# Patient Record
Sex: FEMALE | Race: WHITE | URBAN - METROPOLITAN AREA
[De-identification: names, ages, dates, MRNs, and addresses within clinical notes are randomized per-mention and may not be internally consistent; named-entity substitution may affect disease eponyms.]

---

## 2017-01-01 ENCOUNTER — OFFICE VISIT (OUTPATIENT)
Dept: TRANSPLANT | Facility: CLINIC | Age: 67
End: 2017-01-01
Attending: INTERNAL MEDICINE
Payer: MEDICARE

## 2017-01-01 ENCOUNTER — TRANSFERRED RECORDS (OUTPATIENT)
Dept: HEALTH INFORMATION MANAGEMENT | Facility: CLINIC | Age: 67
End: 2017-01-01

## 2017-01-01 ENCOUNTER — HOSPITAL ENCOUNTER (OUTPATIENT)
Facility: CLINIC | Age: 67
Discharge: HOME | End: 2017-03-16
Attending: RADIOLOGY | Admitting: RADIOLOGY
Payer: MEDICARE

## 2017-01-01 ENCOUNTER — MYC MEDICAL ADVICE (OUTPATIENT)
Dept: INTERNAL MEDICINE | Facility: CLINIC | Age: 67
End: 2017-01-01

## 2017-01-01 ENCOUNTER — CARE COORDINATION (OUTPATIENT)
Dept: TRANSPLANT | Facility: CLINIC | Age: 67
End: 2017-01-01

## 2017-01-01 ENCOUNTER — TELEPHONE (OUTPATIENT)
Dept: INTERVENTIONAL RADIOLOGY/VASCULAR | Facility: CLINIC | Age: 67
End: 2017-01-01

## 2017-01-01 ENCOUNTER — ONCOLOGY VISIT (OUTPATIENT)
Dept: ONCOLOGY | Facility: CLINIC | Age: 67
End: 2017-01-01
Attending: INTERNAL MEDICINE
Payer: COMMERCIAL

## 2017-01-01 ENCOUNTER — HOSPITAL ENCOUNTER (OUTPATIENT)
Dept: CT IMAGING | Facility: CLINIC | Age: 67
End: 2017-03-16
Attending: CLINICAL NURSE SPECIALIST | Admitting: RADIOLOGY
Payer: MEDICARE

## 2017-01-01 ENCOUNTER — MYC MEDICAL ADVICE (OUTPATIENT)
Dept: OBGYN | Facility: CLINIC | Age: 67
End: 2017-01-01

## 2017-01-01 ENCOUNTER — OFFICE VISIT (OUTPATIENT)
Dept: INTERNAL MEDICINE | Facility: CLINIC | Age: 67
End: 2017-01-01
Payer: COMMERCIAL

## 2017-01-01 ENCOUNTER — OFFICE VISIT (OUTPATIENT)
Dept: OBGYN | Facility: CLINIC | Age: 67
End: 2017-01-01
Payer: COMMERCIAL

## 2017-01-01 ENCOUNTER — APPOINTMENT (OUTPATIENT)
Dept: MEDSURG UNIT | Facility: CLINIC | Age: 67
End: 2017-01-01
Attending: RADIOLOGY
Payer: MEDICARE

## 2017-01-01 ENCOUNTER — RADIANT APPOINTMENT (OUTPATIENT)
Dept: ULTRASOUND IMAGING | Facility: CLINIC | Age: 67
End: 2017-01-01
Attending: INTERNAL MEDICINE
Payer: COMMERCIAL

## 2017-01-01 ENCOUNTER — OFFICE VISIT (OUTPATIENT)
Dept: INTERVENTIONAL RADIOLOGY/VASCULAR | Facility: CLINIC | Age: 67
End: 2017-01-01

## 2017-01-01 VITALS
TEMPERATURE: 98.9 F | SYSTOLIC BLOOD PRESSURE: 125 MMHG | HEART RATE: 80 BPM | BODY MASS INDEX: 24.71 KG/M2 | DIASTOLIC BLOOD PRESSURE: 75 MMHG | OXYGEN SATURATION: 94 % | RESPIRATION RATE: 16 BRPM | WEIGHT: 169.75 LBS

## 2017-01-01 VITALS
DIASTOLIC BLOOD PRESSURE: 74 MMHG | SYSTOLIC BLOOD PRESSURE: 127 MMHG | TEMPERATURE: 98 F | RESPIRATION RATE: 18 BRPM | HEART RATE: 81 BPM | BODY MASS INDEX: 25.09 KG/M2 | WEIGHT: 172.4 LBS

## 2017-01-01 VITALS
WEIGHT: 172 LBS | BODY MASS INDEX: 25.04 KG/M2 | DIASTOLIC BLOOD PRESSURE: 60 MMHG | SYSTOLIC BLOOD PRESSURE: 102 MMHG | OXYGEN SATURATION: 98 % | HEART RATE: 75 BPM

## 2017-01-01 VITALS
HEART RATE: 76 BPM | TEMPERATURE: 96.4 F | BODY MASS INDEX: 24.99 KG/M2 | WEIGHT: 171.6 LBS | SYSTOLIC BLOOD PRESSURE: 121 MMHG | DIASTOLIC BLOOD PRESSURE: 75 MMHG

## 2017-01-01 VITALS
DIASTOLIC BLOOD PRESSURE: 52 MMHG | BODY MASS INDEX: 23.69 KG/M2 | SYSTOLIC BLOOD PRESSURE: 101 MMHG | RESPIRATION RATE: 18 BRPM | HEIGHT: 70 IN | TEMPERATURE: 98 F | OXYGEN SATURATION: 98 % | WEIGHT: 165.5 LBS

## 2017-01-01 VITALS
OXYGEN SATURATION: 97 % | SYSTOLIC BLOOD PRESSURE: 126 MMHG | HEIGHT: 70 IN | HEART RATE: 88 BPM | WEIGHT: 172.7 LBS | BODY MASS INDEX: 24.73 KG/M2 | DIASTOLIC BLOOD PRESSURE: 72 MMHG | TEMPERATURE: 98.1 F

## 2017-01-01 VITALS
WEIGHT: 172 LBS | BODY MASS INDEX: 25.04 KG/M2 | SYSTOLIC BLOOD PRESSURE: 115 MMHG | DIASTOLIC BLOOD PRESSURE: 69 MMHG | HEART RATE: 79 BPM | OXYGEN SATURATION: 97 %

## 2017-01-01 DIAGNOSIS — C83.398 DIFFUSE LARGE B-CELL LYMPHOMA OF EXTRANODAL SITE: Primary | ICD-10-CM

## 2017-01-01 DIAGNOSIS — R59.9 ENLARGED LYMPH NODES: Primary | ICD-10-CM

## 2017-01-01 DIAGNOSIS — R59.9 ENLARGED LYMPH NODES: ICD-10-CM

## 2017-01-01 DIAGNOSIS — N85.9 FLUID IN ENDOMETRIAL CAVITY: ICD-10-CM

## 2017-01-01 DIAGNOSIS — Z94.81 STATUS POST BONE MARROW TRANSPLANT (H): ICD-10-CM

## 2017-01-01 DIAGNOSIS — C85.90 NON-HODGKIN'S LYMPHOMA, UNSPECIFIED BODY REGION, UNSPECIFIED NON-HODGKIN LYMPHOMA TYPE (H): ICD-10-CM

## 2017-01-01 DIAGNOSIS — N85.9 FLUID IN ENDOMETRIAL CAVITY: Primary | ICD-10-CM

## 2017-01-01 DIAGNOSIS — R05.9 COUGH: ICD-10-CM

## 2017-01-01 DIAGNOSIS — C83.38 DIFFUSE LARGE B-CELL LYMPHOMA OF LYMPH NODES OF MULTIPLE REGIONS (H): Primary | ICD-10-CM

## 2017-01-01 DIAGNOSIS — K21.9 GASTROESOPHAGEAL REFLUX DISEASE WITHOUT ESOPHAGITIS: ICD-10-CM

## 2017-01-01 DIAGNOSIS — F33.1 MODERATE EPISODE OF RECURRENT MAJOR DEPRESSIVE DISORDER (H): ICD-10-CM

## 2017-01-01 DIAGNOSIS — Z85.3 PERSONAL HISTORY OF MALIGNANT NEOPLASM OF BREAST: ICD-10-CM

## 2017-01-01 DIAGNOSIS — C34.91 ADENOCARCINOMA, LUNG, RIGHT (H): ICD-10-CM

## 2017-01-01 DIAGNOSIS — C34.90 MALIGNANT NEOPLASM OF LUNG, UNSPECIFIED LATERALITY, UNSPECIFIED PART OF LUNG (H): Primary | ICD-10-CM

## 2017-01-01 DIAGNOSIS — C85.90 NON-HODGKIN'S LYMPHOMA, UNSPECIFIED BODY REGION, UNSPECIFIED NON-HODGKIN LYMPHOMA TYPE (H): Primary | ICD-10-CM

## 2017-01-01 LAB
BASOPHILS # BLD AUTO: 0 10E9/L (ref 0–0.2)
BASOPHILS NFR BLD AUTO: 0.6 %
COPATH REPORT: NORMAL
DIFFERENTIAL METHOD BLD: ABNORMAL
EOSINOPHIL # BLD AUTO: 0.1 10E9/L (ref 0–0.7)
EOSINOPHIL NFR BLD AUTO: 2.3 %
ERYTHROCYTE [DISTWIDTH] IN BLOOD BY AUTOMATED COUNT: 14.2 % (ref 10–15)
HCT VFR BLD AUTO: 41.9 % (ref 35–47)
HGB BLD-MCNC: 13.5 G/DL (ref 11.7–15.7)
IMM GRANULOCYTES # BLD: 0 10E9/L (ref 0–0.4)
IMM GRANULOCYTES NFR BLD: 0 %
INR BLD: 1 (ref 0.86–1.14)
LYMPHOCYTES # BLD AUTO: 0.9 10E9/L (ref 0.8–5.3)
LYMPHOCYTES NFR BLD AUTO: 24.6 %
MCH RBC QN AUTO: 31.5 PG (ref 26.5–33)
MCHC RBC AUTO-ENTMCNC: 32.2 G/DL (ref 31.5–36.5)
MCV RBC AUTO: 98 FL (ref 78–100)
MONOCYTES # BLD AUTO: 0.3 10E9/L (ref 0–1.3)
MONOCYTES NFR BLD AUTO: 8.1 %
NEUTROPHILS # BLD AUTO: 2.2 10E9/L (ref 1.6–8.3)
NEUTROPHILS NFR BLD AUTO: 64.4 %
NRBC # BLD AUTO: 0 10*3/UL
NRBC BLD AUTO-RTO: 0 /100
PLATELET # BLD AUTO: 195 10E9/L (ref 150–450)
RBC # BLD AUTO: 4.28 10E12/L (ref 3.8–5.2)
WBC # BLD AUTO: 3.5 10E9/L (ref 4–11)

## 2017-01-01 PROCEDURE — 25000128 H RX IP 250 OP 636: Performed by: PHYSICIAN ASSISTANT

## 2017-01-01 PROCEDURE — C1769 GUIDE WIRE: HCPCS

## 2017-01-01 PROCEDURE — 99204 OFFICE O/P NEW MOD 45 MIN: CPT | Mod: 25 | Performed by: OBSTETRICS & GYNECOLOGY

## 2017-01-01 PROCEDURE — 99153 MOD SED SAME PHYS/QHP EA: CPT

## 2017-01-01 PROCEDURE — 90648 HIB PRP-T VACCINE 4 DOSE IM: CPT | Mod: ZF | Performed by: INTERNAL MEDICINE

## 2017-01-01 PROCEDURE — 85025 COMPLETE CBC W/AUTO DIFF WBC: CPT | Performed by: RADIOLOGY

## 2017-01-01 PROCEDURE — 25000128 H RX IP 250 OP 636: Performed by: RADIOLOGY

## 2017-01-01 PROCEDURE — 76830 TRANSVAGINAL US NON-OB: CPT | Performed by: OBSTETRICS & GYNECOLOGY

## 2017-01-01 PROCEDURE — 88365 INSITU HYBRIDIZATION (FISH): CPT | Performed by: INTERNAL MEDICINE

## 2017-01-01 PROCEDURE — 76856 US EXAM PELVIC COMPLETE: CPT | Performed by: OBSTETRICS & GYNECOLOGY

## 2017-01-01 PROCEDURE — 99152 MOD SED SAME PHYS/QHP 5/>YRS: CPT

## 2017-01-01 PROCEDURE — 88342 IMHCHEM/IMCYTCHM 1ST ANTB: CPT | Performed by: INTERNAL MEDICINE

## 2017-01-01 PROCEDURE — 99213 OFFICE O/P EST LOW 20 MIN: CPT | Mod: ZF

## 2017-01-01 PROCEDURE — 25000128 H RX IP 250 OP 636: Performed by: NURSE PRACTITIONER

## 2017-01-01 PROCEDURE — 88333 PATH CONSLTJ SURG CYTO XM 1: CPT | Performed by: INTERNAL MEDICINE

## 2017-01-01 PROCEDURE — 88185 FLOWCYTOMETRY/TC ADD-ON: CPT | Performed by: RADIOLOGY

## 2017-01-01 PROCEDURE — 90746 HEPB VACCINE 3 DOSE ADULT IM: CPT | Mod: ZF | Performed by: INTERNAL MEDICINE

## 2017-01-01 PROCEDURE — 25000125 ZZHC RX 250: Mod: ZF | Performed by: INTERNAL MEDICINE

## 2017-01-01 PROCEDURE — 49180 BIOPSY ABDOMINAL MASS: CPT

## 2017-01-01 PROCEDURE — 90670 PCV13 VACCINE IM: CPT | Mod: ZF | Performed by: INTERNAL MEDICINE

## 2017-01-01 PROCEDURE — 99215 OFFICE O/P EST HI 40 MIN: CPT | Mod: ZP | Performed by: INTERNAL MEDICINE

## 2017-01-01 PROCEDURE — 58100 BIOPSY OF UTERUS LINING: CPT | Performed by: OBSTETRICS & GYNECOLOGY

## 2017-01-01 PROCEDURE — 88341 IMHCHEM/IMCYTCHM EA ADD ANTB: CPT | Performed by: INTERNAL MEDICINE

## 2017-01-01 PROCEDURE — 85610 PROTHROMBIN TIME: CPT | Mod: QW

## 2017-01-01 PROCEDURE — 99214 OFFICE O/P EST MOD 30 MIN: CPT | Performed by: INTERNAL MEDICINE

## 2017-01-01 PROCEDURE — G0009 ADMIN PNEUMOCOCCAL VACCINE: HCPCS

## 2017-01-01 PROCEDURE — 40000166 ZZH STATISTIC PP CARE STAGE 1

## 2017-01-01 PROCEDURE — 27210909 ZZH NEEDLE CR5

## 2017-01-01 PROCEDURE — 25000125 ZZHC RX 250: Performed by: RADIOLOGY

## 2017-01-01 PROCEDURE — 90715 TDAP VACCINE 7 YRS/> IM: CPT | Mod: ZF | Performed by: INTERNAL MEDICINE

## 2017-01-01 PROCEDURE — 90713 POLIOVIRUS IPV SC/IM: CPT | Mod: ZF | Performed by: INTERNAL MEDICINE

## 2017-01-01 PROCEDURE — G0010 ADMIN HEPATITIS B VACCINE: HCPCS

## 2017-01-01 PROCEDURE — 27210995 ZZH RX 272: Performed by: RADIOLOGY

## 2017-01-01 PROCEDURE — 90472 IMMUNIZATION ADMIN EACH ADD: CPT

## 2017-01-01 PROCEDURE — 25000128 H RX IP 250 OP 636: Mod: ZF | Performed by: INTERNAL MEDICINE

## 2017-01-01 PROCEDURE — 38505 NEEDLE BIOPSY LYMPH NODES: CPT

## 2017-01-01 PROCEDURE — 40000114 ZZH STATISTIC NO CHARGE CLINIC VISIT

## 2017-01-01 PROCEDURE — 88184 FLOWCYTOMETRY/ TC 1 MARKER: CPT | Performed by: RADIOLOGY

## 2017-01-01 PROCEDURE — 99212 OFFICE O/P EST SF 10 MIN: CPT | Mod: 25

## 2017-01-01 PROCEDURE — 88305 TISSUE EXAM BY PATHOLOGIST: CPT | Performed by: INTERNAL MEDICINE

## 2017-01-01 PROCEDURE — 88305 TISSUE EXAM BY PATHOLOGIST: CPT | Performed by: OBSTETRICS & GYNECOLOGY

## 2017-01-01 RX ORDER — FENTANYL CITRATE 50 UG/ML
25-50 INJECTION, SOLUTION INTRAMUSCULAR; INTRAVENOUS EVERY 5 MIN PRN
Status: DISCONTINUED | OUTPATIENT
Start: 2017-01-01 | End: 2017-01-01 | Stop reason: HOSPADM

## 2017-01-01 RX ORDER — ALBUTEROL SULFATE 90 UG/1
2 AEROSOL, METERED RESPIRATORY (INHALATION) EVERY 4 HOURS PRN
Qty: 1 INHALER | Refills: 11 | Status: SHIPPED | OUTPATIENT
Start: 2017-01-01

## 2017-01-01 RX ORDER — NALOXONE HYDROCHLORIDE 0.4 MG/ML
.1-.4 INJECTION, SOLUTION INTRAMUSCULAR; INTRAVENOUS; SUBCUTANEOUS
Status: DISCONTINUED | OUTPATIENT
Start: 2017-01-01 | End: 2017-01-01 | Stop reason: HOSPADM

## 2017-01-01 RX ORDER — HEPARIN SODIUM (PORCINE) LOCK FLUSH IV SOLN 100 UNIT/ML 100 UNIT/ML
10 SOLUTION INTRAVENOUS ONCE
Status: DISCONTINUED | OUTPATIENT
Start: 2017-01-01 | End: 2017-01-01

## 2017-01-01 RX ORDER — LIDOCAINE 40 MG/G
CREAM TOPICAL
Status: DISCONTINUED | OUTPATIENT
Start: 2017-01-01 | End: 2017-01-01 | Stop reason: HOSPADM

## 2017-01-01 RX ORDER — VALACYCLOVIR HYDROCHLORIDE 1 G/1
1000 TABLET, FILM COATED ORAL 3 TIMES DAILY
Qty: 42 TABLET | Refills: 0 | Status: SHIPPED | OUTPATIENT
Start: 2017-01-01 | End: 2017-01-01

## 2017-01-01 RX ORDER — HEPARIN SODIUM (PORCINE) LOCK FLUSH IV SOLN 100 UNIT/ML 100 UNIT/ML
5 SOLUTION INTRAVENOUS ONCE
Status: COMPLETED | OUTPATIENT
Start: 2017-01-01 | End: 2017-01-01

## 2017-01-01 RX ORDER — SODIUM CHLORIDE 9 MG/ML
INJECTION, SOLUTION INTRAVENOUS CONTINUOUS
Status: DISCONTINUED | OUTPATIENT
Start: 2017-01-01 | End: 2017-01-01 | Stop reason: HOSPADM

## 2017-01-01 RX ORDER — FLUMAZENIL 0.1 MG/ML
0.2 INJECTION, SOLUTION INTRAVENOUS
Status: DISCONTINUED | OUTPATIENT
Start: 2017-01-01 | End: 2017-01-01 | Stop reason: HOSPADM

## 2017-01-01 RX ORDER — CITALOPRAM HYDROBROMIDE 40 MG/1
60 TABLET ORAL DAILY
Qty: 135 TABLET | Refills: 1 | Status: SHIPPED | OUTPATIENT
Start: 2017-01-01

## 2017-01-01 RX ADMIN — MIDAZOLAM 2 MG: 1 INJECTION INTRAMUSCULAR; INTRAVENOUS at 12:43

## 2017-01-01 RX ADMIN — TETANUS TOXOID, REDUCED DIPHTHERIA TOXOID AND ACELLULAR PERTUSSIS VACCINE, ADSORBED 0.5 ML: 5; 2.5; 8; 8; 2.5 SUSPENSION INTRAMUSCULAR at 13:29

## 2017-01-01 RX ADMIN — LIDOCAINE HYDROCHLORIDE 10 ML: 10 INJECTION, SOLUTION EPIDURAL; INFILTRATION; INTRACAUDAL; PERINEURAL at 12:43

## 2017-01-01 RX ADMIN — SODIUM CHLORIDE, PRESERVATIVE FREE 5 ML: 5 INJECTION INTRAVENOUS at 13:50

## 2017-01-01 RX ADMIN — HEPATITIS B VACCINE (RECOMBINANT) 20 MCG: 20 INJECTION, SUSPENSION INTRAMUSCULAR at 13:28

## 2017-01-01 RX ADMIN — HAEMOPHILUS B POLYSACCHARIDE CONJUGATE VACCINE FOR INJ 0.5 ML: RECON SOLN at 13:29

## 2017-01-01 RX ADMIN — PNEUMOCOCCAL 13-VALENT CONJUGATE VACCINE 0.5 ML: 2.2; 2.2; 2.2; 2.2; 2.2; 4.4; 2.2; 2.2; 2.2; 2.2; 2.2; 2.2; 2.2 INJECTION, SUSPENSION INTRAMUSCULAR at 13:30

## 2017-01-01 RX ADMIN — POLIOVIRUS TYPE 1 ANTIGEN (FORMALDEHYDE INACTIVATED), POLIOVIRUS TYPE 2 ANTIGEN (FORMALDEHYDE INACTIVATED), AND POLIOVIRUS TYPE 3 ANTIGEN (FORMALDEHYDE INACTIVATED) 0.5 ML: 40; 8; 32 INJECTION, SUSPENSION INTRAMUSCULAR at 13:29

## 2017-01-01 RX ADMIN — SODIUM CHLORIDE: 9 INJECTION, SOLUTION INTRAVENOUS at 10:05

## 2017-01-01 RX ADMIN — FENTANYL CITRATE 100 MCG: 50 INJECTION, SOLUTION INTRAMUSCULAR; INTRAVENOUS at 12:43

## 2017-01-01 ASSESSMENT — ENCOUNTER SYMPTOMS
DECREASED CONCENTRATION: 0
INSOMNIA: 1
DECREASED CONCENTRATION: 1
ABDOMINAL PAIN: 0
CONSTIPATION: 1
CHILLS: 0
PANIC: 0
NECK MASS: 0
RECTAL BLEEDING: 0
TROUBLE SWALLOWING: 0
NAIL CHANGES: 0
DIARRHEA: 0
EYE WATERING: 0
JAUNDICE: 0
WHEEZING: 0
EYE REDNESS: 0
SINUS PAIN: 0
POSTURAL DYSPNEA: 0
DYSPNEA ON EXERTION: 0
HOARSE VOICE: 0
SNORES LOUDLY: 0
EYE PAIN: 0
COUGH DISTURBING SLEEP: 0
NIGHT SWEATS: 0
PANIC: 0
RESPIRATORY PAIN: 0
FEVER: 0
HALLUCINATIONS: 0
BOWEL INCONTINENCE: 0
WEIGHT LOSS: 0
POLYPHAGIA: 0
SPUTUM PRODUCTION: 1
SINUS CONGESTION: 1
FATIGUE: 1
TASTE DISTURBANCE: 0
DEPRESSION: 0
DECREASED APPETITE: 0
BLOOD IN STOOL: 0
POOR WOUND HEALING: 0
SORE THROAT: 0
NERVOUS/ANXIOUS: 1
COUGH: 1
SMELL DISTURBANCE: 0
WEIGHT GAIN: 0
HEMOPTYSIS: 0
EYE IRRITATION: 0
NERVOUS/ANXIOUS: 1
DEPRESSION: 0
NAUSEA: 1
INCREASED ENERGY: 1
DOUBLE VISION: 0
BLOATING: 0
HEARTBURN: 0
RECTAL PAIN: 0
SKIN CHANGES: 0
ALTERED TEMPERATURE REGULATION: 0
INSOMNIA: 1
SHORTNESS OF BREATH: 0
VOMITING: 0
POLYDIPSIA: 0

## 2017-01-01 ASSESSMENT — PAIN SCALES - GENERAL
PAINLEVEL: NO PAIN (0)
PAINLEVEL: MODERATE PAIN (4)

## 2017-01-02 ENCOUNTER — TRANSFERRED RECORDS (OUTPATIENT)
Dept: HEALTH INFORMATION MANAGEMENT | Facility: CLINIC | Age: 67
End: 2017-01-02

## 2017-01-12 DIAGNOSIS — K21.9 GASTROESOPHAGEAL REFLUX DISEASE WITHOUT ESOPHAGITIS: Primary | ICD-10-CM

## 2017-01-12 NOTE — TELEPHONE ENCOUNTER
omeprazole      Last Written Prescription Date:    Last Fill Quantity: ,   # refills:   Last Office Visit with G, P or [facility] prescribing provider: 11/9/16  Future Office visit:       Routing refill request to provider for review/approval because:  Medication is reported/historical

## 2017-01-31 ENCOUNTER — TRANSFERRED RECORDS (OUTPATIENT)
Dept: HEALTH INFORMATION MANAGEMENT | Facility: CLINIC | Age: 67
End: 2017-01-31

## 2017-02-08 ASSESSMENT — ENCOUNTER SYMPTOMS
NERVOUS/ANXIOUS: 1
DECREASED CONCENTRATION: 0
INSOMNIA: 1
DEPRESSION: 0
PANIC: 0

## 2017-02-15 ENCOUNTER — TRANSFERRED RECORDS (OUTPATIENT)
Dept: HEALTH INFORMATION MANAGEMENT | Facility: CLINIC | Age: 67
End: 2017-02-15

## 2017-02-21 ENCOUNTER — HOSPITAL ENCOUNTER (OUTPATIENT)
Dept: PET IMAGING | Facility: CLINIC | Age: 67
Discharge: HOME | End: 2017-02-21
Attending: INTERNAL MEDICINE | Admitting: INTERNAL MEDICINE
Payer: MEDICARE

## 2017-02-21 ENCOUNTER — OFFICE VISIT (OUTPATIENT)
Dept: TRANSPLANT | Facility: CLINIC | Age: 67
End: 2017-02-21
Attending: INTERNAL MEDICINE
Payer: MEDICARE

## 2017-02-21 VITALS — DIASTOLIC BLOOD PRESSURE: 74 MMHG | TEMPERATURE: 97.6 F | SYSTOLIC BLOOD PRESSURE: 119 MMHG | HEART RATE: 75 BPM

## 2017-02-21 DIAGNOSIS — C85.90 NON-HODGKIN'S LYMPHOMA, UNSPECIFIED BODY REGION, UNSPECIFIED NON-HODGKIN LYMPHOMA TYPE (H): ICD-10-CM

## 2017-02-21 DIAGNOSIS — Z29.9 NEED FOR PROPHYLACTIC MEASURE: ICD-10-CM

## 2017-02-21 DIAGNOSIS — C85.90 NON-HODGKIN'S LYMPHOMA, UNSPECIFIED BODY REGION, UNSPECIFIED NON-HODGKIN LYMPHOMA TYPE (H): Primary | ICD-10-CM

## 2017-02-21 DIAGNOSIS — Z29.89 NEED FOR PROPHYLACTIC IMMUNOTHERAPY: ICD-10-CM

## 2017-02-21 LAB
ALBUMIN SERPL-MCNC: 3.8 G/DL (ref 3.4–5)
ALP SERPL-CCNC: 89 U/L (ref 40–150)
ALT SERPL W P-5'-P-CCNC: 34 U/L (ref 0–50)
ANION GAP SERPL CALCULATED.3IONS-SCNC: 8 MMOL/L (ref 3–14)
AST SERPL W P-5'-P-CCNC: 21 U/L (ref 0–45)
BASOPHILS # BLD AUTO: 0 10E9/L (ref 0–0.2)
BASOPHILS NFR BLD AUTO: 0.4 %
BILIRUB SERPL-MCNC: 0.5 MG/DL (ref 0.2–1.3)
BUN SERPL-MCNC: 14 MG/DL (ref 7–30)
CALCIUM SERPL-MCNC: 9.4 MG/DL (ref 8.5–10.1)
CHLORIDE SERPL-SCNC: 104 MMOL/L (ref 94–109)
CO2 SERPL-SCNC: 26 MMOL/L (ref 20–32)
CREAT SERPL-MCNC: 0.89 MG/DL (ref 0.52–1.04)
DIFFERENTIAL METHOD BLD: NORMAL
EOSINOPHIL # BLD AUTO: 0 10E9/L (ref 0–0.7)
EOSINOPHIL NFR BLD AUTO: 0.8 %
ERYTHROCYTE [DISTWIDTH] IN BLOOD BY AUTOMATED COUNT: 14.1 % (ref 10–15)
GFR SERPL CREATININE-BSD FRML MDRD: 63 ML/MIN/1.7M2
GLUCOSE BLDC GLUCOMTR-MCNC: 90 MG/DL (ref 70–99)
GLUCOSE SERPL-MCNC: 86 MG/DL (ref 70–99)
HBV CORE AB SERPL QL IA: NONREACTIVE
HBV SURFACE AG SERPL QL IA: NONREACTIVE
HCT VFR BLD AUTO: 43 % (ref 35–47)
HCV AB SERPL QL IA: NORMAL
HGB BLD-MCNC: 14.1 G/DL (ref 11.7–15.7)
IGA SERPL-MCNC: 42 MG/DL (ref 70–380)
IGG SERPL-MCNC: 619 MG/DL (ref 695–1620)
IGM SERPL-MCNC: 60 MG/DL (ref 60–265)
IMM GRANULOCYTES # BLD: 0 10E9/L (ref 0–0.4)
IMM GRANULOCYTES NFR BLD: 0.2 %
LDH SERPL L TO P-CCNC: 210 U/L (ref 81–234)
LYMPHOCYTES # BLD AUTO: 0.9 10E9/L (ref 0.8–5.3)
LYMPHOCYTES NFR BLD AUTO: 17.6 %
MCH RBC QN AUTO: 31.5 PG (ref 26.5–33)
MCHC RBC AUTO-ENTMCNC: 32.8 G/DL (ref 31.5–36.5)
MCV RBC AUTO: 96 FL (ref 78–100)
MONOCYTES # BLD AUTO: 0.5 10E9/L (ref 0–1.3)
MONOCYTES NFR BLD AUTO: 10.5 %
NEUTROPHILS # BLD AUTO: 3.5 10E9/L (ref 1.6–8.3)
NEUTROPHILS NFR BLD AUTO: 70.5 %
NRBC # BLD AUTO: 0 10*3/UL
NRBC BLD AUTO-RTO: 0 /100
PLATELET # BLD AUTO: 226 10E9/L (ref 150–450)
POTASSIUM SERPL-SCNC: 4.1 MMOL/L (ref 3.4–5.3)
PROT SERPL-MCNC: 6.7 G/DL (ref 6.8–8.8)
RBC # BLD AUTO: 4.47 10E12/L (ref 3.8–5.2)
SODIUM SERPL-SCNC: 139 MMOL/L (ref 133–144)
T4 FREE SERPL-MCNC: 0.9 NG/DL (ref 0.76–1.46)
TSH SERPL DL<=0.05 MIU/L-ACNC: 3.51 MU/L (ref 0.4–4)
WBC # BLD AUTO: 4.9 10E9/L (ref 4–11)

## 2017-02-21 PROCEDURE — 82784 ASSAY IGA/IGD/IGG/IGM EACH: CPT | Performed by: PHYSICIAN ASSISTANT

## 2017-02-21 PROCEDURE — 00000161 ZZHCL STATISTIC H-SPHEME PROCESS B/S: Performed by: PHYSICIAN ASSISTANT

## 2017-02-21 PROCEDURE — 00000058 ZZHCL STATISTIC BONE MARROW ASP PERF TC 38220: Performed by: PHYSICIAN ASSISTANT

## 2017-02-21 PROCEDURE — 88185 FLOWCYTOMETRY/TC ADD-ON: CPT | Performed by: PHYSICIAN ASSISTANT

## 2017-02-21 PROCEDURE — 40000611 ZZHCL STATISTIC MORPHOLOGY W/INTERP HEMEPATH TC 85060: Performed by: PHYSICIAN ASSISTANT

## 2017-02-21 PROCEDURE — 40000425 ZZHCL STATISTIC ADDL BM COR PERF TC 38221: Performed by: PHYSICIAN ASSISTANT

## 2017-02-21 PROCEDURE — 82785 ASSAY OF IGE: CPT | Performed by: PHYSICIAN ASSISTANT

## 2017-02-21 PROCEDURE — 25000128 H RX IP 250 OP 636: Mod: ZF | Performed by: INTERNAL MEDICINE

## 2017-02-21 PROCEDURE — 88264 CHROMOSOME ANALYSIS 20-25: CPT | Performed by: PHYSICIAN ASSISTANT

## 2017-02-21 PROCEDURE — 88305 TISSUE EXAM BY PATHOLOGIST: CPT | Performed by: PHYSICIAN ASSISTANT

## 2017-02-21 PROCEDURE — 88311 DECALCIFY TISSUE: CPT | Performed by: PHYSICIAN ASSISTANT

## 2017-02-21 PROCEDURE — 40000951 ZZHCL STATISTIC BONE MARROW INTERP TC 85097: Performed by: PHYSICIAN ASSISTANT

## 2017-02-21 PROCEDURE — 84443 ASSAY THYROID STIM HORMONE: CPT | Performed by: PHYSICIAN ASSISTANT

## 2017-02-21 PROCEDURE — 86803 HEPATITIS C AB TEST: CPT | Performed by: PHYSICIAN ASSISTANT

## 2017-02-21 PROCEDURE — 88275 CYTOGENETICS 100-300: CPT | Performed by: PHYSICIAN ASSISTANT

## 2017-02-21 PROCEDURE — 88184 FLOWCYTOMETRY/ TC 1 MARKER: CPT | Performed by: PHYSICIAN ASSISTANT

## 2017-02-21 PROCEDURE — 88237 TISSUE CULTURE BONE MARROW: CPT | Performed by: PHYSICIAN ASSISTANT

## 2017-02-21 PROCEDURE — 88280 CHROMOSOME KARYOTYPE STUDY: CPT | Performed by: PHYSICIAN ASSISTANT

## 2017-02-21 PROCEDURE — 40000424 ZZHCL STATISTIC BONE MARROW CORE PERF TC 38221: Performed by: PHYSICIAN ASSISTANT

## 2017-02-21 PROCEDURE — 88161 CYTOPATH SMEAR OTHER SOURCE: CPT | Mod: XU | Performed by: PHYSICIAN ASSISTANT

## 2017-02-21 PROCEDURE — 87340 HEPATITIS B SURFACE AG IA: CPT | Performed by: PHYSICIAN ASSISTANT

## 2017-02-21 PROCEDURE — 40000795 ZZHCL STATISTIC DNA PROCESS AND HOLD: Performed by: PHYSICIAN ASSISTANT

## 2017-02-21 PROCEDURE — 82962 GLUCOSE BLOOD TEST: CPT

## 2017-02-21 PROCEDURE — 83615 LACTATE (LD) (LDH) ENZYME: CPT | Performed by: PHYSICIAN ASSISTANT

## 2017-02-21 PROCEDURE — 85025 COMPLETE CBC W/AUTO DIFF WBC: CPT | Performed by: PHYSICIAN ASSISTANT

## 2017-02-21 PROCEDURE — 86704 HEP B CORE ANTIBODY TOTAL: CPT | Performed by: PHYSICIAN ASSISTANT

## 2017-02-21 PROCEDURE — 25000128 H RX IP 250 OP 636: Mod: ZF | Performed by: STUDENT IN AN ORGANIZED HEALTH CARE EDUCATION/TRAINING PROGRAM

## 2017-02-21 PROCEDURE — 84439 ASSAY OF FREE THYROXINE: CPT | Performed by: PHYSICIAN ASSISTANT

## 2017-02-21 PROCEDURE — 80053 COMPREHEN METABOLIC PANEL: CPT | Performed by: PHYSICIAN ASSISTANT

## 2017-02-21 PROCEDURE — 88271 CYTOGENETICS DNA PROBE: CPT | Performed by: PHYSICIAN ASSISTANT

## 2017-02-21 PROCEDURE — G0364 BONE MARROW ASPIRATE &BIOPSY: HCPCS | Mod: ZF

## 2017-02-21 PROCEDURE — 38221 DX BONE MARROW BIOPSIES: CPT | Mod: 50,ZF

## 2017-02-21 RX ORDER — HEPARIN SODIUM (PORCINE) LOCK FLUSH IV SOLN 100 UNIT/ML 100 UNIT/ML
5 SOLUTION INTRAVENOUS EVERY 8 HOURS
Status: CANCELLED
Start: 2017-02-22

## 2017-02-21 RX ORDER — IOPAMIDOL 755 MG/ML
70-135 INJECTION, SOLUTION INTRAVASCULAR ONCE
Status: COMPLETED | OUTPATIENT
Start: 2017-02-21 | End: 2017-02-21

## 2017-02-21 RX ORDER — HEPARIN SODIUM (PORCINE) LOCK FLUSH IV SOLN 100 UNIT/ML 100 UNIT/ML
5 SOLUTION INTRAVENOUS EVERY 8 HOURS
Status: ACTIVE | OUTPATIENT
Start: 2017-02-21

## 2017-02-21 RX ADMIN — SODIUM CHLORIDE, PRESERVATIVE FREE 5 ML: 5 INJECTION INTRAVENOUS at 13:36

## 2017-02-21 RX ADMIN — FLUDEOXYGLUCOSE F-18 12.1 MCI.: 500 INJECTION, SOLUTION INTRAVENOUS at 08:45

## 2017-02-21 RX ADMIN — MIDAZOLAM 2 MG: 1 INJECTION INTRAMUSCULAR; INTRAVENOUS at 12:52

## 2017-02-21 RX ADMIN — IOPAMIDOL 90 ML: 755 INJECTION, SOLUTION INTRAVENOUS at 09:49

## 2017-02-21 ASSESSMENT — PAIN SCALES - GENERAL: PAINLEVEL: NO PAIN (0)

## 2017-02-21 NOTE — PROGRESS NOTES
BMT ONC Adult Bone Marrow Biopsy Procedure Note  February 21, 2017  /76 (BP Location: Right arm, Patient Position: Chair, Cuff Size: Adult Regular)  Pulse 91  Temp 97.6  F (36.4  C) (Oral)     Learning needs assessment complete within 12 months? YES    DIAGNOSIS: NHL     PROCEDURE: Bilateral Bone Marrow Biopsy and Unilateral Aspirate    LOCATION: [facility] 2nd Floor    Patient s identification was positively verified by verbal identification and invasive procedure safety checklist was completed. Informed consent was obtained. Following the administration of Midazolam as pre-medication, patient was placed in the prone position and prepped and draped in a sterile manner. Approximately 20 cc of 1% Lidocaine was used over the bilateral posterior iliac spine. Following this a 3 mm incision was made. Trephine bone marrow core(s) was (were) obtained from the [facility]. Bone marrow aspirates were obtained from the [facility]. Aspirates were sent for morphology, immunophenotyping, cytogenetics and molecular diagnostics RFLP. A total of approximately 20 ml of marrow was aspirated. Following this procedure a sterile dressing was applied to the bone marrow biopsy site(s). The patient was placed in the supine position to maintain pressure on the biopsy site. Post-procedure wound care instructions were given.     Complications: NO    Pre-procedural pain: 0 out of 10 on the numeric pain rating scale.     Procedural pain: 2 out of 10 on the numeric pain rating scale.     Post-procedural pain assessment: 0 out of 10 on the numeric pain rating scale.     Interventions: NO    Length of procedure:20 minutes or less      Procedure performed by: Josephine LI

## 2017-02-21 NOTE — MR AVS SNAPSHOT
After Visit Summary   2/21/2017    Josseline Simpson    MRN: [id number]           Patient Information     Date Of Birth          1950        Visit Information        Provider Department      2/21/2017 1:00 PM UU BONE MARROW BIOPSY;  BMT ANEUDY #4 [facility] Blood and Marrow Transplant        Today's Diagnoses     Non-Hodgkin's lymphoma, unspecified body region, unspecified non-Hodgkin lymphoma type (H)    -  1          [facility] ([facility])  [address] [place] [id number]  Phone: [phone]  Clinic Hours:   Monday-Friday:    8am to 4:30pm   Weekends and holidays:    8am to noon (in general)  If your fever is 100.5  or greater,   call the clinic.  After hours call the   hospital at [phone] or   [phone]. Ask for the BMT   fellow for pediatric or adult patients            Follow-ups after your visit        Your next 10 appointments already scheduled     Feb 22, 2017  9:15 AM CST   (Arrive by 9:00 AM)   Return Visit with Vamsi Infante MD   [facility] ([facility])    [address] [place] [id number] [phone]            Mar 02, 2017 12:30 PM CST   BMT Anniversary Visit with Lamar Landon MD   [facility] Blood and Marrow Transplant ([facility])    [address] [place] [id number] [phone]              Future tests that were ordered for you today     Open Future Orders        Priority Expected Expires Ordered    PET Oncology Whole Body Routine 9/15/2016 9/15/2017 9/15/2016            Who to contact     If you have questions or need follow up information about today's clinic visit or your schedule please contact [facility] BLOOD AND MARROW TRANSPLANT directly at [phone].  Normal or non-critical lab and imaging results will be communicated to you by MyChart, letter or phone within 4 business days after the clinic has received  the results. If you do not hear from us within 7 days, please contact the clinic through [employer] or phone. If you have a critical or abnormal lab result, we will notify you by phone as soon as possible.  Submit refill requests through [employer] or call your pharmacy and they will forward the refill request to us. Please allow 3 business days for your refill to be completed.          Additional Information About Your Visit        Amb[employer] Information     [employer] gives you secure access to your electronic health record. If you see a primary care provider, you can also send messages to your care team and make appointments. If you have questions, please call your primary care clinic.  If you do not have a primary care provider, please call [phone] and they will assist you.        Care EveryWhere ID     This is your Care EveryWhere ID. This could be used by other organizations to access your [facility] medical records  [id number]        Your Vitals Were     Pulse Temperature                91 97.6  F (36.4  C) (Oral)           Blood Pressure from Last 3 Encounters:   02/21/17 115/76   11/09/16 102/62   09/15/16 115/76    Weight from Last 3 Encounters:   11/09/16 75.8 kg (167 lb)   09/15/16 76.7 kg (169 lb 1.5 oz)   09/08/16 76.4 kg (168 lb 6.9 oz)              We Performed the Following     Basic metabolic panel     Bone Marrow Aspirate (Charge)     Bone marrow biopsy     Bone Marrow Core Biopsy Bilateral (Charge)     CBC with platelets differential     CBC with platelets differential     CHROMOSOME BONE MARROW With Professional Interpretation     Comprehensive metabolic panel     FISH With Professional Interpretation     Hepatitis B core antibody     Hepatitis B surface antigen     Hepatitis C antibody     IgE     Immunoglobulins A G and M     Lactate Dehydrogenase     Leukemia Lymphoma Evaluation     T4 free     TSH        Recent Review Flowsheet Data     BMT Recent Results Latest Ref Rng & Units 8/8/2016 8/9/2016  8/11/2016 8/18/2016 9/8/2016 11/9/2016 2/21/2017    WBC 4.0 - 11.0 10e9/L - 8.7 - 4.7 4.5 4.8 4.9    Hemoglobin 11.7 - 15.7 g/dL 12.1 12.5 - 11.4(L) 12.4 13.2 14.1    Platelet Count 150 - 450 10e9/L - 184 187 334 200 209 226    Neutrophils (Absolute) 1.6 - 8.3 10e9/L - - - 3.2 2.8 - 3.5    INR 0.86 - 1.14 - - - - - - -    Sodium 133 - 144 mmol/L 139 139 - 138 138 - -    Potassium 3.4 - 5.3 mmol/L 4.0 4.2 - 4.1 4.0 - -    Chloride 94 - 109 mmol/L - 105 - 103 103 - -    Glucose 70 - 99 mg/dL 153(H) 102(H) - 93 82 - -    Urea Nitrogen 7 - 30 mg/dL - 12 - 12 13 - -    Creatinine 0.52 - 1.04 mg/dL - 0.80 - 0.67 0.75 - -    Calcium (Total) 8.5 - 10.1 mg/dL - 9.1 - 9.3 9.5 - -    Protein (Total) 6.8 - 8.8 g/dL - - - 6.2(L) 6.3(L) - -    Albumin 3.4 - 5.0 g/dL - - - 3.1(L) 3.6 - -    Alkaline Phosphatase 40 - 150 U/L - - - 132 112 - -    AST 0 - 45 U/L - - - 16 20 - -    ALT 0 - 50 U/L - - - 37 28 - -    MCV 78 - 100 fl - 96 - 96 94 96 96               Primary Care Provider Office Phone # Fax #    Akila Lazo [phone]       [facility] [address]  [facility] [address]        Thank you!     Thank you for choosing [facility] BLOOD AND MARROW TRANSPLANT  for your care. Our goal is always to provide you with excellent care. Hearing back from our patients is one way we can continue to improve our services. Please take a few minutes to complete the written survey that you may receive in the mail after your visit with us. Thank you!             Your Updated Medication List - Protect others around you: Learn how to safely use, store and throw away your medicines at www.disposemymeds.org.          This list is accurate as of: 2/21/17  1:08 PM.  Always use your most recent med list.                   Brand Name Dispense Instructions for use    acetaminophen 325 MG tablet    TYLENOL     Take 325-650 mg by mouth as needed for mild pain       albuterol 108 (90 BASE) MCG/ACT Inhaler     PROAIR HFA/PROVENTIL HFA/VENTOLIN HFA    1 Inhaler    Inhale 2 puffs into the lungs every 4 hours as needed for shortness of breath / dyspnea or wheezing       citalopram 40 MG tablet    celeXA    450 tablet    Take 1.5 tablets (60 mg) by mouth daily       CLARITIN 10 MG tablet   Generic drug:  loratadine      Take 10 mg by mouth daily       fluticasone 220 MCG/ACT Inhaler    FLOVENT HFA    3 Inhaler    Inhale 2 puffs into the lungs 2 times daily       Multi-vitamin Tabs tablet     100 tablet    Take 1 tablet by mouth daily       omeprazole 20 MG CR capsule    priLOSEC    90 capsule    Take 1 capsule (20 mg) by mouth daily       sulfamethoxazole-trimethoprim 800-160 MG per tablet    BACTRIM DS/SEPTRA DS    30 tablet    Take 1 tablet by mouth Every Mon, Tues two times daily DO NOT start taking until day + 28 after your transplant       VITAMIN D (CHOLECALCIFEROL) PO      Take 1,000 Units by mouth 2 times daily

## 2017-02-21 NOTE — PROGRESS NOTES
BMT Teaching Flowsheet    Josseline Simpson is a 67 year old female  The primary encounter diagnosis was NHL (non-Hodgkin's lymphoma). A diagnosis of Non-Hodgkin's lymphoma, unspecified body region, unspecified non-Hodgkin lymphoma type (H) was also pertinent to this visit.    Teaching Topic: bone marrow biopsy    Person(s) involved in teaching: Patient  Motivation Level  Asks Questions: Yes  Eager to Learn: Yes  Cooperative: Yes  Receptive (willing/able to accept information): Yes  Any cultural factors/Caodaism beliefs that may influence understanding or compliance? No    Patient demonstrates understanding of the following:  - Reason for the appointment, diagnosis and treatment plan: Yes  - Knowledge of proper use of medications and conditions for which they are ordered (with special attention to potential side effects or drug interactions): Yes  - Which situations necessitate calling provider and whom to contact: Yes    Teaching concerns addressed: Activity level, pain management, site care    Time spent with patient: 30 minutes.    Specific Concerns: No, explain: Patient received Versed IV prior to procedure.  Tolerated well.  Had no complaints of pain post procedure.  Site is covered, dry and intact.

## 2017-02-22 ENCOUNTER — ONCOLOGY VISIT (OUTPATIENT)
Dept: ONCOLOGY | Facility: CLINIC | Age: 67
End: 2017-02-22
Attending: INTERNAL MEDICINE
Payer: COMMERCIAL

## 2017-02-22 VITALS
RESPIRATION RATE: 16 BRPM | TEMPERATURE: 98.9 F | BODY MASS INDEX: 25.37 KG/M2 | OXYGEN SATURATION: 96 % | WEIGHT: 171.3 LBS | DIASTOLIC BLOOD PRESSURE: 83 MMHG | HEART RATE: 81 BPM | SYSTOLIC BLOOD PRESSURE: 152 MMHG | HEIGHT: 69 IN

## 2017-02-22 DIAGNOSIS — C34.90 MALIGNANT NEOPLASM OF LUNG, UNSPECIFIED LATERALITY, UNSPECIFIED PART OF LUNG (H): Primary | ICD-10-CM

## 2017-02-22 LAB
COPATH REPORT: NORMAL
COPATH REPORT: NORMAL

## 2017-02-22 PROCEDURE — 99214 OFFICE O/P EST MOD 30 MIN: CPT | Mod: ZP | Performed by: INTERNAL MEDICINE

## 2017-02-22 PROCEDURE — 99212 OFFICE O/P EST SF 10 MIN: CPT

## 2017-02-22 ASSESSMENT — PAIN SCALES - GENERAL: PAINLEVEL: NO PAIN (0)

## 2017-02-22 NOTE — MR AVS SNAPSHOT
After Visit Summary   2/22/2017    Josseline Simpson    MRN: [id number]           Patient Information     Date Of Birth          1950        Visit Information        Provider Department      2/22/2017 9:15 AM aVmsi Infante MD [facility]        Today's Diagnoses     Malignant neoplasm of lung, unspecified laterality, unspecified part of lung (H)    -  1       Follow-ups after your visit        Your next 10 appointments already scheduled     Mar 02, 2017 12:30 PM CST   BMT Anniversary Visit with Lamar Landon MD   [facility] Blood and Marrow Transplant ([facility])    [address] [id number] [phone]              Future tests that were ordered for you today     Open Standing Orders        Priority Remaining Interval Expires Ordered    CBC with platelets differential Routine 12/12 2/22/2018 2/22/2017          Open Future Orders        Priority Expected Expires Ordered    Comprehensive metabolic panel Routine  2/22/2018 2/22/2017    CT Chest/Abdomen/Pelvis w Contrast Routine  2/22/2018 2/22/2017    PET Oncology Whole Body Routine 9/15/2016 9/15/2017 9/15/2016            Who to contact     If you have questions or need follow up information about today's clinic visit or your schedule please contact [facility] directly at [phone].  Normal or non-critical lab and imaging results will be communicated to you by MyChart, letter or phone within 4 business days after the clinic has received the results. If you do not hear from us within 7 days, please contact the clinic through MyChart or phone. If you have a critical or abnormal lab result, we will notify you by phone as soon as possible.  Submit refill requests through [employer] or call your pharmacy and they will forward the refill request to us. Please allow 3 business days for your refill to be completed.          Additional  "Information About Your Visit        MyChart Information     [employer] gives you secure access to your electronic health record. If you see a primary care provider, you can also send messages to your care team and make appointments. If you have questions, please call your primary care clinic.  If you do not have a primary care provider, please call [phone] and they will assist you.        Care EveryWhere ID     This is your Care EveryWhere ID. This could be used by other organizations to access your [facility] medical records  [id number]        Your Vitals Were     Pulse Temperature Respirations Height Pulse Oximetry BMI (Body Mass Index)    81 98.9  F (37.2  C) (Oral) 16 1.765 m (5' 9.49\") 96% 24.94 kg/m2       Blood Pressure from Last 3 Encounters:   02/22/17 152/83   02/21/17 119/74   11/09/16 102/62    Weight from Last 3 Encounters:   02/22/17 77.7 kg (171 lb 4.8 oz)   11/09/16 75.8 kg (167 lb)   09/15/16 76.7 kg (169 lb 1.5 oz)                 Today's Medication Changes          These changes are accurate as of: 2/22/17  1:17 PM.  If you have any questions, ask your nurse or doctor.               These medicines have changed or have updated prescriptions.        Dose/Directions    sulfamethoxazole-trimethoprim 800-160 MG per tablet   Commonly known as:  BACTRIM DS/SEPTRA DS   Indication:  pjp prophy   This may have changed:  additional instructions   Used for:  Non Hodgkin's lymphoma (H)        Dose:  1 tablet   Take 1 tablet by mouth Every Mon, Tues two times daily DO NOT start taking until day + 28 after your transplant   Quantity:  30 tablet   Refills:  3                Primary Care Provider Office Phone # Fax #    Akila Lazo [phone]       [facility] [address]   [facility] [address]        Thank you!     Thank you for choosing [facility]  for your care. Our goal is always to provide you with excellent care. Hearing back from our " patients is one way we can continue to improve our services. Please take a few minutes to complete the written survey that you may receive in the mail after your visit with us. Thank you!             Your Updated Medication List - Protect others around you: Learn how to safely use, store and throw away your medicines at www.disposemymeds.org.          This list is accurate as of: 2/22/17  1:17 PM.  Always use your most recent med list.                   Brand Name Dispense Instructions for use    acetaminophen 325 MG tablet    TYLENOL     Take 325-650 mg by mouth as needed for mild pain       albuterol 108 (90 BASE) MCG/ACT Inhaler    PROAIR HFA/PROVENTIL HFA/VENTOLIN HFA    1 Inhaler    Inhale 2 puffs into the lungs every 4 hours as needed for shortness of breath / dyspnea or wheezing       citalopram 40 MG tablet    celeXA    450 tablet    Take 1.5 tablets (60 mg) by mouth daily       CLARITIN 10 MG tablet   Generic drug:  loratadine      Take 10 mg by mouth daily       fluticasone 220 MCG/ACT Inhaler    FLOVENT HFA    3 Inhaler    Inhale 2 puffs into the lungs 2 times daily       Multi-vitamin Tabs tablet     100 tablet    Take 1 tablet by mouth daily       omeprazole 20 MG CR capsule    priLOSEC    90 capsule    Take 1 capsule (20 mg) by mouth daily       sulfamethoxazole-trimethoprim 800-160 MG per tablet    BACTRIM DS/SEPTRA DS    30 tablet    Take 1 tablet by mouth Every Mon, Tues two times daily DO NOT start taking until day + 28 after your transplant       VITAMIN D (CHOLECALCIFEROL) PO      Take 1,000 Units by mouth 2 times daily

## 2017-02-22 NOTE — LETTER
2/22/2017       RE: Josseline Simpson  [address] [facility] [address]     Dear Colleague,    Thank you for referring your patient, Josseline Simpson, to the [facility]. Please see a copy of my visit note below.    CHIEF COMPLAINT:  Stage IA non-small cell lung cancer.      HISTORY OF PRESENT ILLNESS:  The patient returns today for routine clinic followup.  As noted in the previous history, she has a history of non-Hodgkin's lymphoma and is status post autologous bone marrow transplant.  The patient had resection of a stage IA lepidic adenocarcinoma in 08/2016.  No adjuvant therapy was done.  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       We spent 20 minutes of this 25-minute encounter discussing plans for therapy and monitoring.  The patient had a PET scan yesterday which unfortunately appears to show relapse of her lymphoma.  There is no evidence of disease in the chest.  There is a conglomeration of lymph nodes in the pararenal area as well as disease in the liver and a level 4 node on the left neck.  I discussed with the patient and her  the implications of this.  I will discuss with Dr. Landon the possibility of obtaining a biopsy.  There is an outside chance that this is a lung cancer recurrence, in which case there would be effective therapy with EGFR-targeted oral therapy.  However, I think the pattern is much more consistent with recurrence of her lymphoma.  The patient had a bone marrow biopsy yesterday.       REVIEW OF SYSTEMS:  A 10-point review of systems is largely negative.  The patient does not feel any discomfort in her neck.       IMAGING:  I have reviewed the PET scan myself and concur with the radiologist's findings.  I reviewed the PET scan with the patient as well.  There appears to be malignant adenopathy in the retroperitoneum, liver and neck.      LABORATORY:  Laboratory tests reveal a comprehensive metabolic panel that is  normal.  CBC with differential is normal.  TSH is normal at 3.51.  LDH is normal at 210.      PHYSICAL EXAMINATION:  Vital signs are stable.  The patient is afebrile.  HEENT reveals no palpable adenopathy.  The station 4 lymph node on the left is not palpable on exam.        ASSESSMENT AND PLAN:     1.  Stage IA non-small cell lung cancer:  I do not see any evidence of recurrence.  The patient does have an EGFR mutation that would be amenable to EGFR-targeted therapy.  I will suggest the patient have a return to clinic with a CT of the chest, abdomen and pelvis in 6 months.    2.  Non-Hodgkin's lymphoma:  It appears to me that the patient likely has a recurrence of her non-Hodgkin's lymphoma.  I will relay this information to Dr. Landon, who is to see the patient next week.  We may want to schedule a biopsy, particularly of the easily accessible station 4 node in the left neck.  I do not think this is recurrent lung cancer, but it might be worth assessing this as a possibility.         Again, thank you for allowing me to participate in the care of your patient.      Sincerely,    Vamsi Infante MD       cc:  Lamar Landon MD, [facility]

## 2017-02-22 NOTE — PROGRESS NOTES
CHIEF COMPLAINT:  Stage IA non-small cell lung cancer.      HISTORY OF PRESENT ILLNESS:  The patient returns today for routine clinic followup.  As noted in the previous history, she has a history of non-Hodgkin's lymphoma and is status post autologous bone marrow transplant.  The patient had resection of a stage IA lepidic adenocarcinoma in 08/2016.  No adjuvant therapy was done.  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       We spent 20 minutes of this 25-minute encounter discussing plans for therapy and monitoring.  The patient had a PET scan yesterday which unfortunately appears to show relapse of her lymphoma.  There is no evidence of disease in the chest.  There is a conglomeration of lymph nodes in the pararenal area as well as disease in the liver and a level 4 node on the left neck.  I discussed with the patient and her  the implications of this.  I will discuss with Dr. Landon the possibility of obtaining a biopsy.  There is an outside chance that this is a lung cancer recurrence, in which case there would be effective therapy with EGFR-targeted oral therapy.  However, I think the pattern is much more consistent with recurrence of her lymphoma.  The patient had a bone marrow biopsy yesterday.       REVIEW OF SYSTEMS:  A 10-point review of systems is largely negative.  The patient does not feel any discomfort in her neck.       IMAGING:  I have reviewed the PET scan myself and concur with the radiologist's findings.  I reviewed the PET scan with the patient as well.  There appears to be malignant adenopathy in the retroperitoneum, liver and neck.      LABORATORY:  Laboratory tests reveal a comprehensive metabolic panel that is normal.  CBC with differential is normal.  TSH is normal at 3.51.  LDH is normal at 210.      PHYSICAL EXAMINATION:  Vital signs are stable.  The patient is afebrile.  HEENT reveals no palpable adenopathy.  The station 4 lymph node on the left is not palpable on  exam.        ASSESSMENT AND PLAN:     1.  Stage IA non-small cell lung cancer:  I do not see any evidence of recurrence.  The patient does have an EGFR mutation that would be amenable to EGFR-targeted therapy.  I will suggest the patient have a return to clinic with a CT of the chest, abdomen and pelvis in 6 months.    2.  Non-Hodgkin's lymphoma:  It appears to me that the patient likely has a recurrence of her non-Hodgkin's lymphoma.  I will relay this information to Dr. Landon, who is to see the patient next week.  We may want to schedule a biopsy, particularly of the easily accessible station 4 node in the left neck.  I do not think this is recurrent lung cancer, but it might be worth assessing this as a possibility.        cc:  Lamar Landon MD, [facility]       Answers for HPI/ROS submitted by the patient on 2/8/2017   General Symptoms: No  Skin Symptoms: No  HENT Symptoms: No  EYE SYMPTOMS: No  HEART SYMPTOMS: No  LUNG SYMPTOMS: No  INTESTINAL SYMPTOMS: No  URINARY SYMPTOMS: No  GYNECOLOGIC SYMPTOMS: No  BREAST SYMPTOMS: No  SKELETAL SYMPTOMS: No  BLOOD SYMPTOMS: No  NERVOUS SYSTEM SYMPTOMS: No  MENTAL HEALTH SYMPTOMS: Yes  Nervous or Anxious: Yes  Depression: No  Trouble sleeping: Yes  Trouble thinking or concentrating: No  Mood changes: Yes  Panic attacks: No

## 2017-02-22 NOTE — NURSING NOTE
"Josseline Simpson is a 67 year old female who presents for:  Chief Complaint   Patient presents with     Oncology Clinic Visit     Non hodgkins lymphoma        Initial Vitals:  /83 (BP Location: Left arm, Patient Position: Chair, Cuff Size: Adult Regular)  Pulse 81  Temp 98.9  F (37.2  C) (Oral)  Resp 16  Ht 1.765 m (5' 9.49\")  Wt 77.7 kg (171 lb 4.8 oz)  SpO2 96%  BMI 24.94 kg/m2 Estimated body mass index is 24.94 kg/(m^2) as calculated from the following:    Height as of this encounter: 1.765 m (5' 9.49\").    Weight as of this encounter: 77.7 kg (171 lb 4.8 oz).. Body surface area is 1.95 meters squared. BP completed using cuff size: regular  No Pain (0) No LMP recorded. Patient is postmenopausal. Allergies and medications reviewed.     Medications: Medication refills not needed today.  Pharmacy name entered into [employer]:    [id number] - [place], [place] - [address] [place] [address] [address] AT [facility] & [address] - [address] [facility] [phone]    Comments:     7 minutes for nursing intake (face to face time)   Idalia Davidson MA      "

## 2017-02-24 LAB — IGE SERPL-ACNC: <2 KIU/L (ref 0–114)

## 2017-03-02 NOTE — MR AVS SNAPSHOT
After Visit Summary   3/2/2017    Josseline Simpson    MRN: [id number]           Patient Information     Date Of Birth          1950        Visit Information        Provider Department      3/2/2017 12:30 PM Lamar Landon MD [facility] Blood and Marrow Transplant        Today's Diagnoses     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)    -  1          [facility] ([facility]  [address] [place] [id number]  Phone: [phone]  Clinic Hours:   Monday-Friday:    8am to 4:30pm   Weekends and holidays:    8am to noon (in general)  If your fever is 100.5  or greater,   call the clinic.  After hours call the   hospital at [phone] or   [phone]. Ask for the BMT   fellow for pediatric or adult patients           Care Instructions    No pt instructions    Aleisha  BMT        Follow-ups after your visit        Your next 10 appointments already scheduled     Mar 10, 2017 10:00 AM CST   Office Visit with David Kunz MD   [facility] ([facility])    [address] [address] [phone]           Bring a current list of meds and any records pertaining to this visit.  For Physicals, please bring immunization records and any forms needing to be filled out.  Please arrive 10 minutes early to complete paperwork.            Mar 13, 2017 10:00 AM CDT   (Arrive by 9:45 AM)   New Patient Visit with [facility] ([facility])    [address] [place] [address] [phone]            Aug 21, 2017  9:30 AM CDT   LAB with  LAB   [facility] ([facility])    [address] [place] [address] [phone]           Patient must bring picture ID.  Patient should be prepared to give a urine specimen  Please do not eat 10-12 hours before  your appointment if you are coming in fasting for labs on lipids, cholesterol, or glucose (sugar).  Pregnant women should follow their Care Team instructions. Water with medications is okay. Do not drink coffee or other fluids.   If you have concerns about taking  your medications, please ask at office or if scheduling via ClearSaleingt, send a message by clicking on Secure Messaging, Message Your Care Team.            Aug 21, 2017 10:00 AM CDT   (Arrive by 9:45 AM)   CT CHEST/ABDOMEN/PELVIS W CONTRAST with UCCT2   [facility] [place] [facility] ([facility])    [address]  [facility] [address] [phone]           Please bring any scans or X-rays taken at other hospitals, if similar tests were done. Also bring a list of your medicines, including vitamins, minerals and over-the-counter drugs. It is safest to leave personal items at home.  Be sure to tell your doctor:   If you have any allergies.   If there s any chance you are pregnant.   If you are breastfeeding.   If you have any special needs.  You may have contrast for this exam. To prepare:   Do not eat or drink for 2 hours before your exam. If you need to take medicine, you may take it with small sips of water. (We may ask you to take liquid medicine as well.)   The day before your exam, drink extra fluids at least six 8-ounce glasses (unless your doctor tells you to restrict your fluids).  Patients over 70 or patients with diabetes or kidney problems:   If you haven t had a blood test (creatinine test) within the last 30 days, go to your clinic or Diagnostic Imaging Department for this test.  If you have diabetes:   If your kidney function is normal, continue taking your metformin (Avandamet, Glucophage, Glucovance, Metaglip) on the day of your exam.   If your kidney function is abnormal, wait 48 hours before restarting this medicine.  You will have oral contrast for this exam:   You will drink the contrast at home.  Get this from your clinic or Diagnostic Imaging Department. Please follow the directions given.  Please wear loose clothing, such as a sweat suit or jogging clothes. Avoid snaps, zippers and other metal. We may ask you to undress and put on a hospital gown.  If you have any questions, please call the Imaging Department where you will have your exam.            Aug 23, 2017  9:45 AM CDT   (Arrive by 9:30 AM)   Return Visit with Vamsi Infante MD   [facility] ([facility])    [address] [facility]  [address] [facility] [address] [phone]              Who to contact     If you have questions or need follow up information about today's clinic visit or your schedule please contact [facility] BLOOD AND MARROW TRANSPLANT directly at [phone].  Normal or non-critical lab and imaging results will be communicated to you by MyChart, letter or phone within 4 business days after the clinic has received the results. If you do not hear from us within 7 days, please contact the clinic through Ebuzzing and Teadshart or phone. If you have a critical or abnormal lab result, we will notify you by phone as soon as possible.  Submit refill requests through Haptik or call your pharmacy and they will forward the refill request to us. Please allow 3 business days for your refill to be completed.          Additional Information About Your Visit        MyChart Information     Haptik gives you secure access to your electronic health record. If you see a primary care provider, you can also send messages to your care team and make appointments. If you have questions, please call your primary care clinic.  If you do not have a primary care provider, please call [phone] and they will assist you.        Care EveryWhere ID     This is your Care EveryWhere ID. This could be used by other organizations to access your [facility] medical records  [id number]        Your Vitals Were     Pulse  Temperature BMI (Body Mass Index)             76 96.4  F (35.8  C) (Oral) 24.99 kg/m2          Blood Pressure from Last 3 Encounters:   03/06/17 126/72   03/02/17 121/75   02/22/17 152/83    Weight from Last 3 Encounters:   03/06/17 78.3 kg (172 lb 11.2 oz)   03/02/17 77.8 kg (171 lb 9.6 oz)   02/22/17 77.7 kg (171 lb 4.8 oz)              Today, you had the following     No orders found for display         Today's Medication Changes          These changes are accurate as of: 3/2/17 11:59 PM.  If you have any questions, ask your nurse or doctor.               These medicines have changed or have updated prescriptions.        Dose/Directions    sulfamethoxazole-trimethoprim 800-160 MG per tablet   Commonly known as:  BACTRIM DS/SEPTRA DS   Indication:  pjp prophy   This may have changed:  additional instructions   Used for:  Non Hodgkin's lymphoma (H)        Dose:  1 tablet   Take 1 tablet by mouth Every Mon, Tues two times daily DO NOT start taking until day + 28 after your transplant   Quantity:  30 tablet   Refills:  3                Recent Review Flowsheet Data     BMT Recent Results Latest Ref Rng & Units 8/9/2016 8/11/2016 8/18/2016 9/8/2016 11/9/2016 2/21/2017 2/21/2017    WBC 4.0 - 11.0 10e9/L 8.7 - 4.7 4.5 4.8 - 4.9    Hemoglobin 11.7 - 15.7 g/dL 12.5 - 11.4(L) 12.4 13.2 - 14.1    Platelet Count 150 - 450 10e9/L 184 187 334 200 209 - 226    Neutrophils (Absolute) 1.6 - 8.3 10e9/L - - 3.2 2.8 - - 3.5    INR 0.86 - 1.14 - - - - - - -    Sodium 133 - 144 mmol/L 139 - 138 138 - - 139    Potassium 3.4 - 5.3 mmol/L 4.2 - 4.1 4.0 - - 4.1    Chloride 94 - 109 mmol/L 105 - 103 103 - - 104    Glucose 70 - 99 mg/dL 102(H) - 93 82 - 90 86    Urea Nitrogen 7 - 30 mg/dL 12 - 12 13 - - 14    Creatinine 0.52 - 1.04 mg/dL 0.80 - 0.67 0.75 - - 0.89    Calcium (Total) 8.5 - 10.1 mg/dL 9.1 - 9.3 9.5 - - 9.4    Protein (Total) 6.8 - 8.8 g/dL - - 6.2(L) 6.3(L) - - 6.7(L)    Albumin 3.4 - 5.0 g/dL - - 3.1(L) 3.6 - - 3.8    Alkaline  Phosphatase 40 - 150 U/L - - 132 112 - - 89    AST 0 - 45 U/L - - 16 20 - - 21    ALT 0 - 50 U/L - - 37 28 - - 34    MCV 78 - 100 fl 96 - 96 94 96 - 96               Primary Care Provider Office Phone # Fax #    Akila Lazo [phone]       [facility] [address]  [facility] [address]        Thank you!     Thank you for choosing [facility] BLOOD AND MARROW TRANSPLANT  for your care. Our goal is always to provide you with excellent care. Hearing back from our patients is one way we can continue to improve our services. Please take a few minutes to complete the written survey that you may receive in the mail after your visit with us. Thank you!             Your Updated Medication List - Protect others around you: Learn how to safely use, store and throw away your medicines at www.disposemymeds.org.          This list is accurate as of: 3/2/17 11:59 PM.  Always use your most recent med list.                   Brand Name Dispense Instructions for use    acetaminophen 325 MG tablet    TYLENOL     Take 325-650 mg by mouth as needed for mild pain       citalopram 40 MG tablet    celeXA    450 tablet    Take 1.5 tablets (60 mg) by mouth daily       CLARITIN 10 MG tablet   Generic drug:  loratadine      Take 10 mg by mouth daily       fluticasone 220 MCG/ACT Inhaler    FLOVENT HFA    3 Inhaler    Inhale 2 puffs into the lungs 2 times daily       Multi-vitamin Tabs tablet     100 tablet    Take 1 tablet by mouth daily       omeprazole 20 MG CR capsule    priLOSEC    90 capsule    Take 1 capsule (20 mg) by mouth daily       sulfamethoxazole-trimethoprim 800-160 MG per tablet    BACTRIM DS/SEPTRA DS    30 tablet    Take 1 tablet by mouth Every Mon, Tues two times daily DO NOT start taking until day + 28 after your transplant       VITAMIN D (CHOLECALCIFEROL) PO      Take 1,000 Units by mouth 2 times daily

## 2017-03-02 NOTE — PROGRESS NOTES
BMT Heme Malignancy Rooming Note    Josseline Simpson - 3/2/2017 12:17 PM     S/P bMT for NHL     /75 (BP Location: Right arm, Patient Position: Right side, Cuff Size: Adult Regular)  Pulse 76  Temp 96.4  F (35.8  C) (Oral)  Wt 77.8 kg (171 lb 9.6 oz)  BMI 24.99 kg/m2    Data Unavailable     Medications reviewed: Yes    Labs drawn: No    Dressing changed: No     Medications given: No    Staff time:6     Additional information if applicable: Pt is here for review    Meena Cleveland MA

## 2017-03-02 NOTE — PROGRESS NOTES
BONE MARROW TRANSPLANT FOLLOWUP NOTE       HISTORY OF PRESENT ILLNESS:  The patient is a 67-year-old female who is being seen for a 1-year anniversary visit for an autologous peripheral blood stem cell transplant for diffuse large B-cell lymphoma on 03/10/2016.  The patient is a 67-year-old female who was last seen for her 6-month evaluation in 09/2016.  I will briefly summarize her past medical history.  She has a past history of breast cancer which was treated with a right mastectomy, axillary lymph node dissection and chemotherapy in 1997.  She then subsequently presented in 09/2013 with intra-abdominal lymphadenopathy and a paravertebral mass as well as left axillary lymph nodes.  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  She was treated with CHOP chemotherapy without Rituxan because the CD20 studies were negative.  She completed 6 cycles of chemotherapy in 01/2014.  At that time, repeat imaging demonstrated a CR.  In 11/2014 she developed cervical lymphadenopathy without any constitutional symptoms and underwent an excisional biopsy.  This pathology revealed diffuse large B-cell lymphoma with some immunoblastic features.  Flow cytometry revealed this to be positive for CD19, CD20, CD5, monotypic surface and cytoplasmic lambda immunoglobulin light chains, but lacked CD10.  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  There was also loss of 1 copy each related to the tetraploid chromosomes which means 3 copies total of chromosomes 3, 4, 8, 10, 12 and 15.  In addition, there were 2 copies of a derivative chromosome composed of the long arm of chromosome 1 and chromosome 17 with concomitant losses of 1p and 17p.  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  FISH probes to BCL6, MYC and BCL2 indicated that 51% had 4 BCL6 signals, 29% had 3 MYC signals, 20% had 4 MYC signals, and 50% had 4 BCL2 signals.  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  The patient was treated subsequently with 2 cycles of R-ICE chemotherapy.  Her pre-transplant PET/CT revealed no evidence of residual PET activity, though there was some enlarged lymphadenopathy.  There was ground-glass infiltrate in the right lung which had been present for at least 2 years without change in size.  Her bone marrow flow studies revealed 1.1% CD5-positive lambda monotypic cells.  Electrophoresis revealed a monoclonal spike of 0.1 g/dL.  She received growth factor and Mozobil for priming and collected a total of 5.26 million CD34 cells per kilogram after 1 day of collection.  She received an autologous peripheral blood stem cell transplant on 03/10 after conditioning.  Her post-transplant course was complicated by neutropenic fever, mucositis, neutropenic typhilitis, nausea and diarrhea.  Her day 100 PET/CT had shown an increased size of a solid and ground-glass nodule in the superior segment of the right lower lobe concerning for adenocarcinoma.  She was referred to Thoracic Surgery and underwent a biopsy of this lesion.  The biopsies revealed adenocarcinoma with acinar 20% and lepidic growth pattern 80%, 1.2 cm in size.  Lymph nodes were negative.  She was seen by Dr. Infante in Oncology and was staged as T1 N0 M0, stage IA tumor.  She did not need chemotherapy or radiation therapy.  She has been following with Dr. Infante for this.  She was restaged with a PET/CT and bone marrow biopsy on 02/21.  Unfortunately, the PET/CT on 02/21 is showing signs of lymphoma recurrence including increased size and hypermetabolism of conglomerate lymphadenopathy in the para-aortic, left perirenal and  portocaval area with new, 1.1-cm, hypermetabolic, left cervical level 4 lymph node, focal hypermetabolism in the caudate lobe of the liver, and new focal hypermetabolism in the left inferolateral aspect of the T10 vertebral body.  There was also a comment about fluid in the endometrial cavity without defined associated hypermetabolism.  The maximum SUV was seen in the caudate lobe of the liver with an SUV of 9.9.  The conglomerate lymphadenopathy was seen to have an SUV of 7.8.  Bone marrow biopsy showed a cellularity of 30% with trilineage hematopoiesis and no evidence of lymphoma.  Counts were normal with white count of 4.9, hemoglobin of 14.1, and platelet count of 226,000.  Chemistries were normal.  Creatinine was normal at 0.89.  Liver function tests were normal.  LDH was 210.  Her free T4 and TSH were normal.       REVIEW OF SYSTEMS:  The patient reports that she has been feeling fine.  She does not report any fevers, night sweats, loss of weight or loss of appetite.  She does not report any recent infections.  She has no nausea, vomiting, diarrhea, dysuria, hematuria, bleeding, bruising or any focal deficits.  The remainder of a 10-point review of systems was essentially negative.       PHYSICAL EXAMINATION:   VITAL SIGNS:  Vital signs are normal as recorded by the nurse and reviewed by me.   HEENT:  Pupils are equally round and reactive to light.  Extraocular movements are intact.  Moist mucous membranes.    CHEST:  Clear to auscultation bilaterally.    HEART:  Heart sounds are regular in rate and rhythm.  There are no murmurs or gallops.    ABDOMEN:  Soft and nontender.  There is no hepatosplenomegaly.    NEUROLOGICAL:  Exam was nonfocal.     LYMPH:  There was no significant cervical, axillary or inguinal lymphadenopathy that was palpable.  No abdominal masses were palpable, either.       LABORATORY DATA:  As above.      ASSESSMENT AND PLAN:     1.  The patient is a 67-year-old female who comes here for  1-year restaging after an autologous hematopoietic cell transplant for diffuse large B-cell lymphoma.  The patient was diagnosed with lung cancer and has undergone resection within the last year.  Now she has new lymphadenopathy with PET-avid lesions in her abdomen.  This is suspicious for relapse of her original lymphoma.  We will arrange for her to undergo a biopsy of this lymph node mass.  She will be seen by Interventional Radiology.  I will see her back after the biopsy to discuss further course of action.    2.  Immunizations.  She is 1 year post-transplant.  We will initiate her immunizations at this time.        I will see the patient back once the results of the lymph node mass biopsy are available to us.

## 2017-03-02 NOTE — NURSING NOTE
Patient received 1 year vaccines today - tolerated well.  ActHib vaccine was accidentally given SQ instead of IM, pharmacist was notified and action will be taken if necessary.  Vaccine information  Packet was also given to patient.

## 2017-03-06 NOTE — PROGRESS NOTES
"  SUBJECTIVE:                                                    Josseline Simpson is a 67 year old female who presents to clinic today for the following health issues:      Problem list and histories reviewed & adjusted, as indicated.  Additional history: as documented    HPI:   She is here for asthma and recurrence of her Lymphoma. She has had bone marrow transplant. And now within a year has the lymphoma back in multiple locations. She also has fluid in the endometrial cavity and Oncology sent her to me to work that up.     She actually feels well. Denies pain, shortness of breath, cough wheezing, nausea or wt loss.    Reviewed and updated as needed this visit by clinical staff  Tobacco  Allergies  Meds  Med Hx  Surg Hx  Fam Hx  Soc Hx      Reviewed and updated as needed this visit by Provider         ROS:  Constitutional, HEENT, cardiovascular, pulmonary, GI, , musculoskeletal, neuro, skin, endocrine and psych systems are negative, except as otherwise noted.    OBJECTIVE:                                                    /72 (BP Location: Left arm, Patient Position: Chair, Cuff Size: Adult Regular)  Pulse 88  Temp 98.1  F (36.7  C) (Oral)  Ht 5' 9.5\" (1.765 m)  Wt 172 lb 11.2 oz (78.3 kg)  SpO2 97%  BMI 25.14 kg/m2  Body mass index is 25.14 kg/(m^2).  GENERAL: healthy, alert and no distress  NECK: large node left neck  RESP: lungs clear to auscultation - no rales, rhonchi or wheezes  CV: regular rate and rhythm, normal S1 S2, no S3 or S4, no murmur, click or rub, no peripheral edema and peripheral pulses strong  ABDOMEN: soft, nontender, no hepatosplenomegaly, no masses and bowel sounds normal  MS: no gross musculoskeletal defects noted, no edema       ASSESSMENT/PLAN:                                                        1. Fluid in endometrial cavity  Will check pelvic ultrasound and refer to Gynecology. With her multiple cancers she is at risk for anything at this point  - US Pelvic " Complete w Transvaginal; Future    2. Personal history of malignant neoplasm of breast     - US Pelvic Complete w Transvaginal; Future    3. Status post bone marrow transplant (H)     - US Pelvic Complete w Transvaginal; Future    4. Non-Hodgkin's lymphoma, unspecified body region, unspecified non-Hodgkin lymphoma type (H)     - US Pelvic Complete w Transvaginal; Future    5. Adenocarcinoma, lung, right (H)     - US Pelvic Complete w Transvaginal; Future    6. Asthma    refilled  - albuterol (PROAIR HFA/PROVENTIL HFA/VENTOLIN HFA) 108 (90 BASE) MCG/ACT Inhaler; Inhale 2 puffs into the lungs every 4 hours as needed for shortness of breath / dyspnea or wheezing  Dispense: 1 Inhaler; Refill: 11    Follow up PRN.     Akila Lazo MD  [facility]

## 2017-03-06 NOTE — NURSING NOTE
"Chief Complaint   Patient presents with     RECHECK     Patient state she had her 1 year check up at the  and found that she has fluid in her uterus and would like to have a pelvic ultrasound.       Initial /72 (BP Location: Left arm, Patient Position: Chair, Cuff Size: Adult Regular)  Pulse 88  Temp 98.1  F (36.7  C) (Oral)  Ht 5' 9.5\" (1.765 m)  Wt 172 lb 11.2 oz (78.3 kg)  SpO2 97%  BMI 25.14 kg/m2 Estimated body mass index is 25.14 kg/(m^2) as calculated from the following:    Height as of this encounter: 5' 9.5\" (1.765 m).    Weight as of this encounter: 172 lb 11.2 oz (78.3 kg).  Medication Reconciliation: complete   .Elif      "

## 2017-03-06 NOTE — MR AVS SNAPSHOT
After Visit Summary   3/6/2017    Josseline Simpson    MRN: [id number]           Patient Information     Date Of Birth          1950        Visit Information        Provider Department      3/6/2017 8:20 AM Akila Lazo MD [facility]        Today's Diagnoses     Fluid in endometrial cavity    -  1    Personal history of malignant neoplasm of breast        Status post bone marrow transplant (H)        Non-Hodgkin's lymphoma, unspecified body region, unspecified non-Hodgkin lymphoma type (H)        Adenocarcinoma, lung, right (H)        Cough           Follow-ups after your visit        Your next 10 appointments already scheduled     Mar 13, 2017 10:00 AM CDT   (Arrive by 9:45 AM)   New Patient Visit with ALEX Pyle [facility] ([facility])    [address] [place] [id number] [phone]            Aug 21, 2017  9:30 AM CDT   LAB with  LAB   [facility] ([facility])    [address] [place] [id number] [phone]           Patient must bring picture ID.  Patient should be prepared to give a urine specimen  Please do not eat 10-12 hours before your appointment if you are coming in fasting for labs on lipids, cholesterol, or glucose (sugar).  Pregnant women should follow their Care Team instructions. Water with medications is okay. Do not drink coffee or other fluids.   If you have concerns about taking  your medications, please ask at office or if scheduling via [employer], send a message by clicking on Secure Messaging, Message Your Care Team.            Aug 21, 2017 10:00 AM CDT   (Arrive by 9:45 AM)   CT CHEST/ABDOMEN/PELVIS W CONTRAST with UCCT2   [facility] ([facility])    [address] [place] [id number] [phone]           Please bring any scans  or X-rays taken at other hospitals, if similar tests were done. Also bring a list of your medicines, including vitamins, minerals and over-the-counter drugs. It is safest to leave personal items at home.  Be sure to tell your doctor:   If you have any allergies.   If there s any chance you are pregnant.   If you are breastfeeding.   If you have any special needs.  You may have contrast for this exam. To prepare:   Do not eat or drink for 2 hours before your exam. If you need to take medicine, you may take it with small sips of water. (We may ask you to take liquid medicine as well.)   The day before your exam, drink extra fluids at least six 8-ounce glasses (unless your doctor tells you to restrict your fluids).  Patients over 70 or patients with diabetes or kidney problems:   If you haven t had a blood test (creatinine test) within the last 30 days, go to your clinic or Diagnostic Imaging Department for this test.  If you have diabetes:   If your kidney function is normal, continue taking your metformin (Avandamet, Glucophage, Glucovance, Metaglip) on the day of your exam.   If your kidney function is abnormal, wait 48 hours before restarting this medicine.  You will have oral contrast for this exam:   You will drink the contrast at home. Get this from your clinic or Diagnostic Imaging Department. Please follow the directions given.  Please wear loose clothing, such as a sweat suit or jogging clothes. Avoid snaps, zippers and other metal. We may ask you to undress and put on a hospital gown.  If you have any questions, please call the Imaging Department where you will have your exam.            Aug 23, 2017  9:45 AM CDT   (Arrive by 9:30 AM)   Return Visit with Vamsi Infante MD   [facility] ([facility])    [address] [facility] [address] [phone]              Future tests that were ordered for you today     Open Future Orders      "   Priority Expected Expires Ordered    US Pelvic Complete w Transvaginal ASAP 6/4/2017 3/6/2018 3/6/2017            Who to contact     If you have questions or need follow up information about today's clinic visit or your schedule please contact [facility] directly at [phone].  Normal or non-critical lab and imaging results will be communicated to you by MyChart, letter or phone within 4 business days after the clinic has received the results. If you do not hear from us within 7 days, please contact the clinic through [employer] or phone. If you have a critical or abnormal lab result, we will notify you by phone as soon as possible.  Submit refill requests through [employer] or call your pharmacy and they will forward the refill request to us. Please allow 3 business days for your refill to be completed.          Additional Information About Your Visit        MyChart Information     [employer] gives you secure access to your electronic health record. If you see a primary care provider, you can also send messages to your care team and make appointments. If you have questions, please call your primary care clinic.  If you do not have a primary care provider, please call [phone] and they will assist you.        Care EveryWhere ID     This is your Care EveryWhere ID. This could be used by other organizations to access your [facility] medical records  [id number]        Your Vitals Were     Pulse Temperature Height Pulse Oximetry BMI (Body Mass Index)       88 98.1  F (36.7  C) (Oral) 5' 9.5\" (1.765 m) 97% 25.14 kg/m2        Blood Pressure from Last 3 Encounters:   03/06/17 126/72   03/02/17 121/75   02/22/17 152/83    Weight from Last 3 Encounters:   03/06/17 172 lb 11.2 oz (78.3 kg)   03/02/17 171 lb 9.6 oz (77.8 kg)   02/22/17 171 lb 4.8 oz (77.7 kg)                 Today's Medication Changes          These changes are accurate as of: 3/6/17  8:50 AM.  If you have any questions, ask your nurse or " doctor.               These medicines have changed or have updated prescriptions.        Dose/Directions    sulfamethoxazole-trimethoprim 800-160 MG per tablet   Commonly known as:  BACTRIM DS/SEPTRA DS   Indication:  pjp prophy   This may have changed:  additional instructions   Used for:  Non Hodgkin's lymphoma (H)        Dose:  1 tablet   Take 1 tablet by mouth Every Mon, Tues two times daily DO NOT start taking until day + 28 after your transplant   Quantity:  30 tablet   Refills:  3            Where to get your medicines      These medications were sent to [facility] [address] [facility] [address] [facility] [address]  [address] [facility] [address], [facility] [address]     Phone:  [phone]     albuterol 108 (90 BASE) MCG/ACT Inhaler                Primary Care Provider Office Phone # Fax #    Akila Lazo [phone]       [facility] [address]  [facility] [address]        Thank you!     Thank you for choosing [facility]  for your care. Our goal is always to provide you with excellent care. Hearing back from our patients is one way we can continue to improve our services. Please take a few minutes to complete the written survey that you may receive in the mail after your visit with us. Thank you!             Your Updated Medication List - Protect others around you: Learn how to safely use, store and throw away your medicines at www.disposemymeds.org.          This list is accurate as of: 3/6/17  8:50 AM.  Always use your most recent med list.                   Brand Name Dispense Instructions for use    acetaminophen 325 MG tablet    TYLENOL     Take 325-650 mg by mouth as needed for mild pain       albuterol 108 (90 BASE) MCG/ACT Inhaler    PROAIR HFA/PROVENTIL HFA/VENTOLIN HFA    1 Inhaler    Inhale 2 puffs into the lungs every 4 hours as needed for shortness of breath / dyspnea or wheezing       citalopram 40  MG tablet    celeXA    450 tablet    Take 1.5 tablets (60 mg) by mouth daily       CLARITIN 10 MG tablet   Generic drug:  loratadine      Take 10 mg by mouth daily       fluticasone 220 MCG/ACT Inhaler    FLOVENT HFA    3 Inhaler    Inhale 2 puffs into the lungs 2 times daily       Multi-vitamin Tabs tablet     100 tablet    Take 1 tablet by mouth daily       omeprazole 20 MG CR capsule    priLOSEC    90 capsule    Take 1 capsule (20 mg) by mouth daily       sulfamethoxazole-trimethoprim 800-160 MG per tablet    BACTRIM DS/SEPTRA DS    30 tablet    Take 1 tablet by mouth Every Mon, Tues two times daily DO NOT start taking until day + 28 after your transplant       VITAMIN D (CHOLECALCIFEROL) PO      Take 1,000 Units by mouth 2 times daily

## 2017-03-07 NOTE — TELEPHONE ENCOUNTER
Future Order Information for Pelvic US  Expected Expires            6/4/2017 (Approximate) 3/6/2018        It looks like pelvic US isn't due yet but pt thinks it is to be done this week.

## 2017-03-10 NOTE — NURSING NOTE
"Chief Complaint   Patient presents with     Results     US       Initial /60  Pulse 75  Wt 172 lb (78 kg)  SpO2 98%  BMI 25.04 kg/m2 Estimated body mass index is 25.04 kg/(m^2) as calculated from the following:    Height as of 3/6/17: 5' 9.5\" (1.765 m).    Weight as of this encounter: 172 lb (78 kg).  BP completed using cuff size: regular        The following HM Due: NONE      The following patient reported/Care Every where data was sent to:  P ABSTRACT QUALITY INITIATIVES [[id number]]           Jessica Vann, BRANDIE                        "

## 2017-03-10 NOTE — PATIENT INSTRUCTIONS
You can reach your [facility] Care Team any time of the day by calling [phone]. This number will put you in touch with the 24 hour nurse line if the clinic is closed.    To contact your OB/GYN Surgery Scheduler please call [phone]. This is a direct number for your care team between 8 a.m. and 4 p.m. Monday through Friday.    [facility] is open for your convenience: [phone]  Monday through Friday 8 a.m. to 8:30 p.m.  Saturday 9 a.m. to 6 p.m.  Sunday Noon to 6 p.m.    They are closed on all major holidays.

## 2017-03-10 NOTE — PROGRESS NOTES
HPI:  Josseline Simpson is a 67 year old female  No LMP recorded. Patient is postmenopausal. (age 48), no HRT, no post menopausal bleeding, who presents for evaluation of fluid in the endometrial cavity. She is accompanied by her .     She notes that she had a lymphoma recurrence recently, and has had a bone marrow transplant exactly 1 year ago. She was told that she had free fluid in the endometrial cavity when she had her follow up PET scan. She does have a history of right breast cancer and had a mastectomy in . She also had a history of lung adenocarcinoma felt perhaps to be related to her previous bone marrow stem cell transplant. She notes that she does feel good over all.     She had a US and the results can be seen below:   ULTRASOUND - PELVIC GYN - 3/7/17  CLINICAL INFORMATION      Indications for ultrasound:   Fluid in endometrial canal on PET scan  Hx of breast cancer, non-hodgkins lymphoma, lung adenocarcinoma      LMP: post johnathan 19 yrs Hormones: none      Measurements:  Uterus: 5.1 x 3.3 x 4.1 cm.   Position is anteverted. Contour is irreg w myomata:   1) mid 1.0 x 0.6 x 1.0cm.      Endo cav: 3 mm Smooth/regular/wnl  Cervix; wnl      Right ovary: nv  Left ovary: nv      Cul de sac: no free fluid      *Other findings:       ===================================  Complete pelvic ultrasound utilizing both abdominal and vaginal transducers. Small myoma, not affecting cavity of uterus.  Risks, benefits, and alternative modes of therapy discussed at length. Pathophysiology of the disease process reviewed, all of the patients questions answered and informed consent obtained.      Past Medical History   Diagnosis Date     Anxiety      Arthritis      generalized, especially knees     Bone marrow transplant status (H) 3/2016     Breast cancer (H)      right     Breast cancer (H)      Depression      History of blood transfusion      Lung nodule      NHL (non-Hodgkin's lymphoma) (H)  2013     Vasovagal syncope      after knee surgery     Past Surgical History   Procedure Laterality Date     Mastectomy, simple rt/lt/james Right      Arthroscopy knee Left      Biopsy lymph node cervical Right 2015     Procedure: BIOPSY LYMPH NODE CERVICAL;  Surgeon: David Marvin MD;  Location: [facility] OR     Tonsillectomy       Laparoscopic wedge resection lung Right 2016     Procedure: LAPAROSCOPIC WEDGE RESECTION LUNG;  Surgeon: Loi Woodruff MD;  Location: [facility] OR     Family History   Problem Relation Age of Onset     CEREBROVASCULAR DISEASE Mother       84 yo and arthritis     CANCER Mother 75     Basal Cell Skin Cancer      DIABETES Father       91 yo and CHF, psych illness     Psychotic Disorder Sister      DIABETES Paternal Grandmother      CANCER Maternal Grandmother      HEART DISEASE Maternal Grandfather      HEART DISEASE Paternal Grandfather      Breast Cancer No family hx of      Social History     Social History     Marital status:      Spouse name: N/A     Number of children: N/A     Years of education: N/A     Occupational History      [employer]     Social History Main Topics     Smoking status: Never Smoker     Smokeless tobacco: Never Used     Alcohol use 0.0 oz/week     0 Standard drinks or equivalent per week      Comment: wine - one per day     Drug use: No     Sexual activity: Yes     Partners: Male     Other Topics Concern     Not on file     Social History Narrative    Retired.  .    1 son, healthy    4 siblings: good.       Allergies:  Vancomycin    Current Outpatient Prescriptions   Medication Sig Dispense Refill     albuterol (PROAIR HFA/PROVENTIL HFA/VENTOLIN HFA) 108 (90 BASE) MCG/ACT Inhaler Inhale 2 puffs into the lungs every 4 hours as needed for shortness of breath / dyspnea or wheezing 1 Inhaler 11     omeprazole (PRILOSEC) 20 MG CR capsule Take 1 capsule (20 mg) by mouth daily 90 capsule 1      fluticasone (FLOVENT HFA) 220 MCG/ACT inhaler Inhale 2 puffs into the lungs 2 times daily 3 Inhaler 1     sulfamethoxazole-trimethoprim (BACTRIM DS,SEPTRA DS) 800-160 MG per tablet Take 1 tablet by mouth Every Mon, Tues two times daily DO NOT start taking until day + 28 after your transplant (Patient taking differently: Take 1 tablet by mouth Every Mon, Tues two times daily DO NOT start taking until day + 28 after your transplant) 30 tablet 3     citalopram (CELEXA) 40 MG tablet Take 1.5 tablets (60 mg) by mouth daily 450 tablet 1     acetaminophen (TYLENOL) 325 MG tablet Take 325-650 mg by mouth as needed for mild pain       loratadine (CLARITIN) 10 MG tablet Take 10 mg by mouth daily       VITAMIN D, CHOLECALCIFEROL, PO Take 1,000 Units by mouth 2 times daily        multivitamin, therapeutic with minerals (MULTI-VITAMIN) TABS Take 1 tablet by mouth daily 100 tablet 3       Review Of Systems  Respiratory: POSITIVE for hx of asthma  Genitourinary: NEGATIVE for postmenopausal bleeding, Amenorrhea   Hematologic/Lymphatic/Immunologic: POSITIVE for recurrence of lymphoma     This document serves as a record of the services and decisions personally performed and made by David Kunz M.D. It was created on his behalf by Sarah Beckford, a trained medical scribe. The creation of this document is based the provider's statements to the medical scribe.  Sarah Beckford March 10, 2017 10:04 AM     Exam:  /60  Pulse 75  Wt 172 lb (78 kg)  SpO2 98%  BMI 25.04 kg/m2  Constitutional: healthy, alert and no distress  Genitourinary: Normal external genitalia without lesions, speculum: normal appearing parous cervix, BM: uterus is small, smooth, firm, mobile, adnexae without masses or enlargement RV: normal sphincter tone, no nodularity, no masses     After obtaining informed consent patient prepped in the usual sterile fashion and endometrial biopsy preformed with a pipelle type sampler without difficulty or complication with a  thorough sampling and acceptable specimen.  The uterus sounded to 7 cm.  the patient tolerated the procedure well and was D/C'd in good condition. Signs and symptoms of complications discussed, patient to call.  patient will call in 1 week to review pathology report and develop an appropriate plan.      Assessment/Plan:  (N85.9) Fluid in endometrial cavity  (primary encounter diagnosis)  Comment: r/o atypia with consideration to hx of lymphoma. No fluid in endometrial cavity noted on recent US  Plan: Surgical pathology exam        Discussed signs of concern. I will follow up with her regarding the results early next week.        The information in this document, created by the medical scribe for me, accurately reflects the services I personally performed and the decisions made by me. I have reviewed and approved this document for accuracy prior to leaving the patient care area.  3/10/2017 10:04 AM          David Kunz M.D.

## 2017-03-10 NOTE — MR AVS SNAPSHOT
After Visit Summary   3/10/2017    Josseline Simpson    MRN: [id number]           Patient Information     Date Of Birth          1950        Visit Information        Provider Department      3/10/2017 10:00 AM David Kunz MD [facility]        Today's Diagnoses     Fluid in endometrial cavity    -  1      Care Instructions    You can reach your [facility] Care Team any time of the day by calling [phone]. This number will put you in touch with the 24 hour nurse line if the clinic is closed.    To contact your OB/GYN Surgery Scheduler please call [phone]. This is a direct number for your care team between 8 a.m. and 4 p.m. Monday through Friday.    [facility] is open for your convenience: [phone]  Monday through Friday 8 a.m. to 8:30 p.m.  Saturday 9 a.m. to 6 p.m.  Sunday Noon to 6 p.m.    They are closed on all major holidays.           Follow-ups after your visit        Your next 10 appointments already scheduled     Mar 13, 2017 10:00 AM CDT   (Arrive by 9:45 AM)   New Patient Visit with ALEX [facility] Interventional Radiology ([facility])    [address] [place] [address] [id number] [phone]            Mar 23, 2017  1:00 PM CDT   Return with Lamar Landon MD   [facility] Blood and Marrow Transplant ([facility])    [address] [place] [address]  2nd [facility] [id number] [phone]            Aug 21, 2017  9:30 AM CDT   LAB with  LAB   [facility] Lab ([facility])    [address] [place] [id number] [phone]           Patient must bring picture ID.  Patient should be prepared to give a urine specimen  Please do not eat 10-12 hours before your appointment if you are coming in fasting for labs on lipids, cholesterol, or glucose (sugar).  Pregnant women should follow their Care Team  instructions. Water with medications is okay. Do not drink coffee or other fluids.   If you have concerns about taking  your medications, please ask at office or if scheduling via [employer], send a message by clicking on Secure Messaging, Message Your Care Team.            Aug 21, 2017 10:00 AM CDT   (Arrive by 9:45 AM)   CT CHEST/ABDOMEN/PELVIS W CONTRAST with UCCT2   [facility] CT ([facility] [place])    [address]  1st Floor  [facility] [address] [phone]           Please bring any scans or X-rays taken at other hospitals, if similar tests were done. Also bring a list of your medicines, including vitamins, minerals and over-the-counter drugs. It is safest to leave personal items at home.  Be sure to tell your doctor:   If you have any allergies.   If there s any chance you are pregnant.   If you are breastfeeding.   If you have any special needs.  You may have contrast for this exam. To prepare:   Do not eat or drink for 2 hours before your exam. If you need to take medicine, you may take it with small sips of water. (We may ask you to take liquid medicine as well.)   The day before your exam, drink extra fluids at least six 8-ounce glasses (unless your doctor tells you to restrict your fluids).  Patients over 70 or patients with diabetes or kidney problems:   If you haven t had a blood test (creatinine test) within the last 30 days, go to your clinic or Diagnostic Imaging Department for this test.  If you have diabetes:   If your kidney function is normal, continue taking your metformin (Avandamet, Glucophage, Glucovance, Metaglip) on the day of your exam.   If your kidney function is abnormal, wait 48 hours before restarting this medicine.  You will have oral contrast for this exam:   You will drink the contrast at home. Get this from your clinic or Diagnostic Imaging Department. Please follow the directions given.  Please wear loose clothing, such as a sweat  suit or jogging clothes. Avoid snaps, zippers and other metal. We may ask you to undress and put on a hospital gown.  If you have any questions, please call the Imaging Department where you will have your exam.            Aug 23, 2017  9:45 AM CDT   (Arrive by 9:30 AM)   Return Visit with Vamsi Infante MD   [facility] ([facility])    [address]  2nd Floor  [facility] [address] [phone]              Who to contact     If you have questions or need follow up information about today's clinic visit or your schedule please contact [facility] directly at [phone].  Normal or non-critical lab and imaging results will be communicated to you by MyChart, letter or phone within 4 business days after the clinic has received the results. If you do not hear from us within 7 days, please contact the clinic through MyChart or phone. If you have a critical or abnormal lab result, we will notify you by phone as soon as possible.  Submit refill requests through [employer] or call your pharmacy and they will forward the refill request to us. Please allow 3 business days for your refill to be completed.          Additional Information About Your Visit        [employer] Information     [employer] gives you secure access to your electronic health record. If you see a primary care provider, you can also send messages to your care team and make appointments. If you have questions, please call your primary care clinic.  If you do not have a primary care provider, please call [phone] and they will assist you.        Care EveryWhere ID     This is your Care EveryWhere ID. This could be used by other organizations to access your [facility] medical records  [id number]        Your Vitals Were     Pulse Pulse Oximetry BMI (Body Mass Index)             75 98% 25.04 kg/m2          Blood Pressure from Last 3 Encounters:   03/10/17 102/60   03/06/17  126/72   03/02/17 121/75    Weight from Last 3 Encounters:   03/10/17 172 lb (78 kg)   03/06/17 172 lb 11.2 oz (78.3 kg)   03/02/17 171 lb 9.6 oz (77.8 kg)              We Performed the Following     ENDOMETRIAL BIOPSY W/O CERVICAL DILATION     Surgical pathology exam          Today's Medication Changes          These changes are accurate as of: 3/10/17 10:57 AM.  If you have any questions, ask your nurse or doctor.               These medicines have changed or have updated prescriptions.        Dose/Directions    sulfamethoxazole-trimethoprim 800-160 MG per tablet   Commonly known as:  BACTRIM DS/SEPTRA DS   Indication:  pjp prophy   This may have changed:  additional instructions   Used for:  Non Hodgkin's lymphoma (H)        Dose:  1 tablet   Take 1 tablet by mouth Every Mon, Tues two times daily DO NOT start taking until day + 28 after your transplant   Quantity:  30 tablet   Refills:  3                Primary Care Provider Office Phone # Fax #    Akila Lazo [phone]       [facility] [address]   [facility] [address]        Thank you!     Thank you for choosing [facility]  for your care. Our goal is always to provide you with excellent care. Hearing back from our patients is one way we can continue to improve our services. Please take a few minutes to complete the written survey that you may receive in the mail after your visit with us. Thank you!             Your Updated Medication List - Protect others around you: Learn how to safely use, store and throw away your medicines at www.disposemymeds.org.          This list is accurate as of: 3/10/17 10:57 AM.  Always use your most recent med list.                   Brand Name Dispense Instructions for use    acetaminophen 325 MG tablet    TYLENOL     Take 325-650 mg by mouth as needed for mild pain       albuterol 108 (90 BASE) MCG/ACT Inhaler    PROAIR HFA/PROVENTIL HFA/VENTOLIN HFA     1 Inhaler    Inhale 2 puffs into the lungs every 4 hours as needed for shortness of breath / dyspnea or wheezing       citalopram 40 MG tablet    celeXA    450 tablet    Take 1.5 tablets (60 mg) by mouth daily       CLARITIN 10 MG tablet   Generic drug:  loratadine      Take 10 mg by mouth daily       fluticasone 220 MCG/ACT Inhaler    FLOVENT HFA    3 Inhaler    Inhale 2 puffs into the lungs 2 times daily       Multi-vitamin Tabs tablet     100 tablet    Take 1 tablet by mouth daily       omeprazole 20 MG CR capsule    priLOSEC    90 capsule    Take 1 capsule (20 mg) by mouth daily       sulfamethoxazole-trimethoprim 800-160 MG per tablet    BACTRIM DS/SEPTRA DS    30 tablet    Take 1 tablet by mouth Every Mon, Tues two times daily DO NOT start taking until day + 28 after your transplant       VITAMIN D (CHOLECALCIFEROL) PO      Take 1,000 Units by mouth 2 times daily

## 2017-03-13 NOTE — PROGRESS NOTES
First Name: Josseline   Age: 67 year old   Referring Physician: Dr. Landon   REASON FOR REFERRAL: Consult for lymph node biopsy    Assessment:  Josseline is a 66 yo with a hx of breast cancer, NHL and is post BMT 3/2016 and lung cancer.  She has an increased size and hypermetabolism of lymphadenopathy in the left perirenal, per-aortic and port a caval area.  The left perirenal area is amendable to biopsy.  Plan:  Image guided biopsy of the left perirenal lymph nodes.  In addition to pathology send for flow cytometry.  Draw an INR on the day of the procedure.  She still has her port a cath in place on the left side and can be used for the procedure.    HPI: This is a patient with a PMH of breast cancer in 1997, arthritis, depression and was diagnosed with NHL in 2013.  She underwent an autologous BMT in March 2016.  In August 2016 she was found to have a lung nodule that was positive for adenocarcinoma and surgically respected.  She has been doing well since the transplant.  However, follow-up imaging shows sgns of lymphoma recurrence including increased size and hypermetabolism of conglomerate lymphadenopathy in the para-aortic/left perirenal/portacaval area.   New 1.1 cm hypermetabolic left cervical level 4 lymph node, focal hypermetabolism in the caudate lobe of the liver, and new focal hypermetabolism at the left inferolateral aspect of the T10 vertebral body.  Dr. Camacho from Interventional Radiology reviewed the imaging and felt that the left perirenal lymphadenopathy could be biopsyied.  Series 5, image 73, using a posterior approach.         PAST MEDICAL HISTORY:   Past Medical History   Diagnosis Date     Anxiety      Arthritis      generalized, especially knees     Bone marrow transplant status (H) 3/2016     Breast cancer (H) 1997     right     Breast cancer (H) 1997     Depression      History of blood transfusion      Lung nodule      NHL (non-Hodgkin's lymphoma) (H) 9/2013     Vasovagal syncope 2005      after knee surgery     PAST SURGICAL HISTORY:   Past Surgical History   Procedure Laterality Date     Mastectomy, simple rt/lt/james Right      Arthroscopy knee Left      Biopsy lymph node cervical Right 2015     Procedure: BIOPSY LYMPH NODE CERVICAL;  Surgeon: David Marvin MD;  Location: [facility] OR     Tonsillectomy       Laparoscopic wedge resection lung Right 2016     Procedure: LAPAROSCOPIC WEDGE RESECTION LUNG;  Surgeon: Loi Woodruff MD;  Location: [facility] OR     FAMILY HISTORY:   Family History   Problem Relation Age of Onset     CEREBROVASCULAR DISEASE Mother       84 yo and arthritis     CANCER Mother 75     Basal Cell Skin Cancer      DIABETES Father       89 yo and CHF, psych illness     Psychotic Disorder Sister      DIABETES Paternal Grandmother      CANCER Maternal Grandmother      HEART DISEASE Maternal Grandfather      HEART DISEASE Paternal Grandfather      Breast Cancer No family hx of      SOCIAL HISTORY:   Social History   Substance Use Topics     Smoking status: Never Smoker     Smokeless tobacco: Never Used     Alcohol use 0.0 oz/week     0 Standard drinks or equivalent per week      Comment: wine - one per day     PROBLEM LIST:   Patient Active Problem List    Diagnosis Date Noted     Non Hodgkin's lymphoma (H) 2016     Priority: Medium     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) 2016     Priority: Medium     Adenocarcinoma, lung, right (H) 2016     Priority: Medium     Moderate episode of recurrent major depressive disorder (H) 2016     Priority: Medium     Hilar lymphadenopathy 2016     Priority: Medium     Lung nodule 2016     Priority: Medium     Family history of basal cell carcinoma 2016     Priority: Medium     Status post bone marrow transplant (H) 2016     Priority: Medium     Non-Hodgkin's lymphoma, unspecified body region, unspecified non-Hodgkin lymphoma type (H)  06/13/2016     Priority: Medium     Neutropenia, febrile (H) 03/15/2016     Priority: Medium     Need for prophylactic immunotherapy 02/25/2016     Priority: Medium     Need for prophylactic measure 02/16/2016     Priority: Medium     CARDIOVASCULAR SCREENING; LDL GOAL LESS THAN 160 05/07/2014     Priority: Medium     Advanced directives, counseling/discussion 03/05/2014     Priority: Medium     Hypercholesteremia 03/05/2014     Priority: Medium     Personal history of malignant neoplasm of breast 03/05/2014     Priority: Medium     MEDICATIONS:   Prescription Medications as of 3/13/2017             albuterol (PROAIR HFA/PROVENTIL HFA/VENTOLIN HFA) 108 (90 BASE) MCG/ACT Inhaler Inhale 2 puffs into the lungs every 4 hours as needed for shortness of breath / dyspnea or wheezing    omeprazole (PRILOSEC) 20 MG CR capsule Take 1 capsule (20 mg) by mouth daily    fluticasone (FLOVENT HFA) 220 MCG/ACT inhaler Inhale 2 puffs into the lungs 2 times daily    sulfamethoxazole-trimethoprim (BACTRIM DS,SEPTRA DS) 800-160 MG per tablet Take 1 tablet by mouth Every Mon, Tues two times daily DO NOT start taking until day + 28 after your transplant    citalopram (CELEXA) 40 MG tablet Take 1.5 tablets (60 mg) by mouth daily    acetaminophen (TYLENOL) 325 MG tablet Take 325-650 mg by mouth as needed for mild pain    loratadine (CLARITIN) 10 MG tablet Take 10 mg by mouth daily    VITAMIN D, CHOLECALCIFEROL, PO Take 1,000 Units by mouth 2 times daily     multivitamin, therapeutic with minerals (MULTI-VITAMIN) TABS Take 1 tablet by mouth daily      Facility Administered Medications as of 3/13/2017             heparin 100 UNIT/ML injection 5 mL 5 mLs by Intracatheter route every 8 hours    heparin 100 UNIT/ML injection 5 mL 5 mLs by Intracatheter route every 8 hours        ALLERGIES: Vancomycin  VITALS: /69  Pulse 79  Wt 78 kg (172 lb)  SpO2 97%  BMI 25.04 kg/m2    ROS:  Answers for HPI/ROS submitted by the patient on 3/4/2017    General Symptoms: No  Skin Symptoms: No  HENT Symptoms: No  EYE SYMPTOMS: Yes  HEART SYMPTOMS: No  LUNG SYMPTOMS: No  INTESTINAL SYMPTOMS: No  URINARY SYMPTOMS: No  GYNECOLOGIC SYMPTOMS: No  BREAST SYMPTOMS: No  SKELETAL SYMPTOMS: No  BLOOD SYMPTOMS: No  NERVOUS SYSTEM SYMPTOMS: No  MENTAL HEALTH SYMPTOMS: Yes  Eye pain: No  Vision loss: No  Dry eyes: No  Watery eyes: No  Eye bulging: No  Double vision: No  Flashing of lights: No  Spots: No  Floaters: Yes  Redness: No  Crossed eyes: No  Tunnel Vision: No  Yellowing of eyes: No  Eye irritation: No  Nervous or Anxious: Yes  Depression: No  Trouble sleeping: Yes  Trouble thinking or concentrating: Yes  Mood changes: Yes  Panic attacks: No    Physical Examination: Vital signs are reviewed and they are stable  Constitutional: Pleasant, older woman, in no acute physical distress, came with her , Jacobo to the appt  Cardiovascular: negative  Respiratory: negative findings: normal respiratory rate and rhythm, lungs clear to auscultation  Musculoskeletal: extremities normal- no gross deformities noted, gait normal and normal muscle tone  Skin: no suspicious lesions or rashes  Neurologic: negative  Psychiatric: affect normal/bright and mentation appears normal.    BMP RESULTS:  Lab Results   Component Value Date     02/21/2017    POTASSIUM 4.1 02/21/2017    CHLORIDE 104 02/21/2017    CO2 26 02/21/2017    ANIONGAP 8 02/21/2017    GLC 86 02/21/2017    BUN 14 02/21/2017    CR 0.89 02/21/2017    GFRESTIMATED 63 02/21/2017    GFRESTBLACK 76 02/21/2017    JERMAIN 9.4 02/21/2017        CBC RESULTS:  Lab Results   Component Value Date    WBC 4.9 02/21/2017    RBC 4.47 02/21/2017    HGB 14.1 02/21/2017    HCT 43.0 02/21/2017    MCV 96 02/21/2017    MCH 31.5 02/21/2017    MCHC 32.8 02/21/2017    RDW 14.1 02/21/2017     02/21/2017       INR/PTT:  Lab Results   Component Value Date    INR 1.00 07/20/2016    INR 0.9 04/12/2016    PTT 27 03/04/2016       Diagnostic  studies:see PET Ct scan 2/21/17.    PROVIDER NOTE:  I explained the procedure to Josseline and her , Jacobo.  This included:  Preparing for the procedure, the actual procedure and recovery.  I explained the risks of the lymph node biopsy:  Bleeding, infection, hitting an unintended organ (vessel or nerve)  I explained that usually the results return after two to four business days.    I explained that he/she would be contacted by Dr. Landon's office following this to determine a future plan.  Thank you for involving us in the care of your patient.    40 minutes was spent with Josseline and her , Jacobo.  30 minutes was spent in counseling.  Gypsy Ruiz MS, APRN, CNS  Clinical Nurse Specialist  Interventional Radiology  [phone] (voice mail)  [phone] (pager)    CC  Patient Care Team:  Akila Lazo MD as PCP - General (Internal Medicine)  Idalia Adler MSW as   Lamar Landon MD as MD (BMT - Adult)  Chidi Wiggins MD as MD (Hematology & Oncology)  Naomi Phelps, RN as BMT Nurse Coordinator (Transplant)  LAMAR LANDON

## 2017-03-13 NOTE — MR AVS SNAPSHOT
After Visit Summary   3/13/2017    Josseline Simpson    MRN: [id number]           Patient Information     Date Of Birth          1950        Visit Information        Provider Department      3/13/2017 10:00 AM Gypsy Ruiz APRN [facility] Interventional Radiology        Today's Diagnoses     Enlarged lymph nodes    -  1      Care Instructions    You are scheduled for your biopsy on Thursday, March 16, 2017   Report to the [facility] Waiting room at 9:30 AM  The [facility] Waiting Room is located on the 2nd floor (street level) of [address].  Your procedure is scheduled to start at approximately 11:00 AM    No solid foods or milk products for 6 hours prior on the day of the procedure.5:00 AM  You may have clear liquids until 2 hours prior on the day of the procedure.(water, apple juice, broth, coffee or art without milk or sugar, jell-o, white grape juice) 9:00 AM    You will need a     If you have any questions you may call the Radiology Nurse Line [phone]        Follow-ups after your visit        Your next 10 appointments already scheduled     Mar 23, 2017  1:00 PM CDT   Return with Lamar Landon MD   [facility] ([facility])    [address] [place] [address] [id number] [phone]            Aug 21, 2017  9:30 AM CDT   LAB with  LAB   [facility] ([facility])    [address] [place] [address] [id number] [phone]           Patient must bring picture ID.  Patient should be prepared to give a urine specimen  Please do not eat 10-12 hours before your appointment if you are coming in fasting for labs on lipids, cholesterol, or glucose (sugar).  Pregnant women should follow their Care Team instructions. Water with medications is okay. Do not drink coffee or other fluids.   If you have concerns about taking  your medications, please ask  at office or if scheduling via [employer], send a message by clicking on Secure Messaging, Message Your Care Team.            Aug 21, 2017 10:00 AM CDT   (Arrive by 9:45 AM)   CT CHEST/ABDOMEN/PELVIS W CONTRAST with UCCT2   [facility] [place] [facility] ([facility])    [address] [id number]   [phone]           Please bring any scans or X-rays taken at other hospitals, if similar tests were done. Also bring a list of your medicines, including vitamins, minerals and over-the-counter drugs. It is safest to leave personal items at home.  Be sure to tell your doctor:   If you have any allergies.   If there s any chance you are pregnant.   If you are breastfeeding.   If you have any special needs.  You may have contrast for this exam. To prepare:   Do not eat or drink for 2 hours before your exam. If you need to take medicine, you may take it with small sips of water. (We may ask you to take liquid medicine as well.)   The day before your exam, drink extra fluids at least six 8-ounce glasses (unless your doctor tells you to restrict your fluids).  Patients over 70 or patients with diabetes or kidney problems:   If you haven t had a blood test (creatinine test) within the last 30 days, go to your clinic or Diagnostic Imaging Department for this test.  If you have diabetes:   If your kidney function is normal, continue taking your metformin (Avandamet, Glucophage, Glucovance, Metaglip) on the day of your exam.   If your kidney function is abnormal, wait 48 hours before restarting this medicine.  You will have oral contrast for this exam:   You will drink the contrast at home. Get this from your clinic or Diagnostic Imaging Department. Please follow the directions given.  Please wear loose clothing, such as a sweat suit or jogging clothes. Avoid snaps, zippers and other metal. We may ask you to undress and put on a hospital gown.  If you have any questions, please  call the Imaging Department where you will have your exam.            Aug 23, 2017  9:45 AM CDT   (Arrive by 9:30 AM)   Return Visit with Vamsi Infante MD   [facility] ([facility])    [address] [facility]  [address] [facility] [id number] [phone]              Future tests that were ordered for you today     Open Future Orders        Priority Expected Expires Ordered    CT Lymph Node Biopsy Routine 3/18/2017 4/27/2017 3/13/2017            Who to contact     Please call your clinic at [phone] to:    Ask questions about your health    Make or cancel appointments    Discuss your medicines    Learn about your test results    Speak to your doctor   If you have compliments or concerns about an experience at your clinic, or if you wish to file a complaint, please contact [facility] Patient Relations at [phone] or email us at [email or web address]         Additional Information About Your Visit        [employer] Medical Information     [employer] gives you secure access to your electronic health record. If you see a primary care provider, you can also send messages to your care team and make appointments. If you have questions, please call your primary care clinic.  If you do not have a primary care provider, please call [phone] and they will assist you.      Trapit is an electronic gateway that provides easy, online access to your medical records. With [employer]it, you can request a clinic appointment, read your test results, renew a prescription or communicate with your care team.     To access your existing account, please contact your [facility] Clinic or call [phone] for assistance.        Care EveryWhere ID     This is your Care EveryWhere ID. This could be used by other organizations to access your [place] medical records  [id number]        Your Vitals Were     Pulse Pulse Oximetry BMI  (Body Mass Index)             79 97% 25.04 kg/m2          Blood Pressure from Last 3 Encounters:   03/13/17 115/69   03/10/17 102/60   03/06/17 126/72    Weight from Last 3 Encounters:   03/13/17 78 kg (172 lb)   03/10/17 78 kg (172 lb)   03/06/17 78.3 kg (172 lb 11.2 oz)                 Today's Medication Changes          These changes are accurate as of: 3/13/17 10:35 AM.  If you have any questions, ask your nurse or doctor.               These medicines have changed or have updated prescriptions.        Dose/Directions    sulfamethoxazole-trimethoprim 800-160 MG per tablet   Commonly known as:  BACTRIM DS/SEPTRA DS   Indication:  pjp prophy   This may have changed:  additional instructions   Used for:  Non Hodgkin's lymphoma (H)        Dose:  1 tablet   Take 1 tablet by mouth Every Mon, Tues two times daily DO NOT start taking until day + 28 after your transplant   Quantity:  30 tablet   Refills:  3                Primary Care Provider Office Phone # Fax #    Akila Lazo [phone]       [facility] [address]   [facility] [address]        Thank you!     Thank you for choosing [facility] INTERVENTIONAL RADIOLOGY  for your care. Our goal is always to provide you with excellent care. Hearing back from our patients is one way we can continue to improve our services. Please take a few minutes to complete the written survey that you may receive in the mail after your visit with us. Thank you!             Your Updated Medication List - Protect others around you: Learn how to safely use, store and throw away your medicines at www.disposemymeds.org.          This list is accurate as of: 3/13/17 10:35 AM.  Always use your most recent med list.                   Brand Name Dispense Instructions for use    acetaminophen 325 MG tablet    TYLENOL     Take 325-650 mg by mouth as needed for mild pain       albuterol 108 (90 BASE) MCG/ACT Inhaler    PROAIR HFA/PROVENTIL  HFA/VENTOLIN HFA    1 Inhaler    Inhale 2 puffs into the lungs every 4 hours as needed for shortness of breath / dyspnea or wheezing       citalopram 40 MG tablet    celeXA    450 tablet    Take 1.5 tablets (60 mg) by mouth daily       CLARITIN 10 MG tablet   Generic drug:  loratadine      Take 10 mg by mouth daily       fluticasone 220 MCG/ACT Inhaler    FLOVENT HFA    3 Inhaler    Inhale 2 puffs into the lungs 2 times daily       Multi-vitamin Tabs tablet     100 tablet    Take 1 tablet by mouth daily       omeprazole 20 MG CR capsule    priLOSEC    90 capsule    Take 1 capsule (20 mg) by mouth daily       sulfamethoxazole-trimethoprim 800-160 MG per tablet    BACTRIM DS/SEPTRA DS    30 tablet    Take 1 tablet by mouth Every Mon, Tues two times daily DO NOT start taking until day + 28 after your transplant       VITAMIN D (CHOLECALCIFEROL) PO      Take 1,000 Units by mouth 2 times daily

## 2017-03-13 NOTE — LETTER
3/13/2017       RE: Josseline Simpson  [address] [facility] [address]     Dear Colleague,    Thank you for referring your patient, Josseline Simpson, to the [facility] INTERVENTIONAL RADIOLOGY at [facility]. Please see a copy of my visit note below.    First Name: Josseline   Age: 67 year old   Referring Physician: Dr. Landon   REASON FOR REFERRAL: Consult for lymph node biopsy    Assessment:  Josseline is a 66 yo with a hx of breast cancer, NHL and is post BMT 3/2016 and lung cancer.  She has an increased size and hypermetabolism of lymphadenopathy in the left perirenal, per-aortic and port a caval area.  The left perirenal area is amendable to biopsy.  Plan:  Image guided biopsy of the left perirenal lymph nodes.  In addition to pathology send for flow cytometry.  Draw an INR on the day of the procedure.  She still has her port a cath in place on the left side and can be used for the procedure.    HPI: This is a patient with a PMH of breast cancer in 1997, arthritis, depression and was diagnosed with NHL in 2013.  She underwent an autologous BMT in March 2016.  In August 2016 she was found to have a lung nodule that was positive for adenocarcinoma and surgically respected.  She has been doing well since the transplant.  However, follow-up imaging shows sgns of lymphoma recurrence including increased size and hypermetabolism of conglomerate lymphadenopathy in the para-aortic/left perirenal/portacaval area.   New 1.1 cm hypermetabolic left cervical level 4 lymph node, focal hypermetabolism in the caudate lobe of the liver, and new focal hypermetabolism at the left inferolateral aspect of the T10 vertebral body.  Dr. Camacho from Interventional Radiology reviewed the imaging and felt that the left perirenal lymphadenopathy could be biopsyied.  Series 5, image 73, using a posterior approach.         PAST MEDICAL HISTORY:   Past Medical History   Diagnosis  Date     Anxiety      Arthritis      generalized, especially knees     Bone marrow transplant status (H) 3/2016     Breast cancer (H)      right     Breast cancer (H)      Depression      History of blood transfusion      Lung nodule      NHL (non-Hodgkin's lymphoma) (H) 2013     Vasovagal syncope      after knee surgery     PAST SURGICAL HISTORY:   Past Surgical History   Procedure Laterality Date     Mastectomy, simple rt/lt/james Right      Arthroscopy knee Left      Biopsy lymph node cervical Right 2015     Procedure: BIOPSY LYMPH NODE CERVICAL;  Surgeon: David Marvin MD;  Location: [facility] OR     Tonsillectomy       Laparoscopic wedge resection lung Right 2016     Procedure: LAPAROSCOPIC WEDGE RESECTION LUNG;  Surgeon: Loi Woodruff MD;  Location: [facility] OR     FAMILY HISTORY:   Family History   Problem Relation Age of Onset     CEREBROVASCULAR DISEASE Mother       82 yo and arthritis     CANCER Mother 75     Basal Cell Skin Cancer      DIABETES Father       89 yo and CHF, psych illness     Psychotic Disorder Sister      DIABETES Paternal Grandmother      CANCER Maternal Grandmother      HEART DISEASE Maternal Grandfather      HEART DISEASE Paternal Grandfather      Breast Cancer No family hx of      SOCIAL HISTORY:   Social History   Substance Use Topics     Smoking status: Never Smoker     Smokeless tobacco: Never Used     Alcohol use 0.0 oz/week     0 Standard drinks or equivalent per week      Comment: wine - one per day     PROBLEM LIST:   Patient Active Problem List    Diagnosis Date Noted     Non Hodgkin's lymphoma (H) 2016     Priority: Medium     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) 2016     Priority: Medium     Adenocarcinoma, lung, right (H) 2016     Priority: Medium     Moderate episode of recurrent major depressive disorder (H) 2016     Priority: Medium     Hilar lymphadenopathy 2016      Priority: Medium     Lung nodule 07/20/2016     Priority: Medium     Family history of basal cell carcinoma 06/21/2016     Priority: Medium     Status post bone marrow transplant (H) 06/13/2016     Priority: Medium     Non-Hodgkin's lymphoma, unspecified body region, unspecified non-Hodgkin lymphoma type (H) 06/13/2016     Priority: Medium     Neutropenia, febrile (H) 03/15/2016     Priority: Medium     Need for prophylactic immunotherapy 02/25/2016     Priority: Medium     Need for prophylactic measure 02/16/2016     Priority: Medium     CARDIOVASCULAR SCREENING; LDL GOAL LESS THAN 160 05/07/2014     Priority: Medium     Advanced directives, counseling/discussion 03/05/2014     Priority: Medium     Hypercholesteremia 03/05/2014     Priority: Medium     Personal history of malignant neoplasm of breast 03/05/2014     Priority: Medium     MEDICATIONS:   Prescription Medications as of 3/13/2017             albuterol (PROAIR HFA/PROVENTIL HFA/VENTOLIN HFA) 108 (90 BASE) MCG/ACT Inhaler Inhale 2 puffs into the lungs every 4 hours as needed for shortness of breath / dyspnea or wheezing    omeprazole (PRILOSEC) 20 MG CR capsule Take 1 capsule (20 mg) by mouth daily    fluticasone (FLOVENT HFA) 220 MCG/ACT inhaler Inhale 2 puffs into the lungs 2 times daily    sulfamethoxazole-trimethoprim (BACTRIM DS,SEPTRA DS) 800-160 MG per tablet Take 1 tablet by mouth Every Mon, Tues two times daily DO NOT start taking until day + 28 after your transplant    citalopram (CELEXA) 40 MG tablet Take 1.5 tablets (60 mg) by mouth daily    acetaminophen (TYLENOL) 325 MG tablet Take 325-650 mg by mouth as needed for mild pain    loratadine (CLARITIN) 10 MG tablet Take 10 mg by mouth daily    VITAMIN D, CHOLECALCIFEROL, PO Take 1,000 Units by mouth 2 times daily     multivitamin, therapeutic with minerals (MULTI-VITAMIN) TABS Take 1 tablet by mouth daily      Facility Administered Medications as of 3/13/2017             heparin 100 UNIT/ML  injection 5 mL 5 mLs by Intracatheter route every 8 hours    heparin 100 UNIT/ML injection 5 mL 5 mLs by Intracatheter route every 8 hours        ALLERGIES: Vancomycin  VITALS: /69  Pulse 79  Wt 78 kg (172 lb)  SpO2 97%  BMI 25.04 kg/m2    Physical Examination: Vital signs are reviewed and they are stable  Constitutional: Pleasant, older woman, in no acute physical distress, came with her Jacobo to the appt  Cardiovascular: negative  Respiratory: negative findings: normal respiratory rate and rhythm, lungs clear to auscultation  Musculoskeletal: extremities normal- no gross deformities noted, gait normal and normal muscle tone  Skin: no suspicious lesions or rashes  Neurologic: negative  Psychiatric: affect normal/bright and mentation appears normal.    BMP RESULTS:  Lab Results   Component Value Date     02/21/2017    POTASSIUM 4.1 02/21/2017    CHLORIDE 104 02/21/2017    CO2 26 02/21/2017    ANIONGAP 8 02/21/2017    GLC 86 02/21/2017    BUN 14 02/21/2017    CR 0.89 02/21/2017    GFRESTIMATED 63 02/21/2017    GFRESTBLACK 76 02/21/2017    JERMAIN 9.4 02/21/2017        CBC RESULTS:  Lab Results   Component Value Date    WBC 4.9 02/21/2017    RBC 4.47 02/21/2017    HGB 14.1 02/21/2017    HCT 43.0 02/21/2017    MCV 96 02/21/2017    MCH 31.5 02/21/2017    MCHC 32.8 02/21/2017    RDW 14.1 02/21/2017     02/21/2017       INR/PTT:  Lab Results   Component Value Date    INR 1.00 07/20/2016    INR 0.9 04/12/2016    PTT 27 03/04/2016       Diagnostic studies:see PET Ct scan 2/21/17.    PROVIDER NOTE:  I explained the procedure to Josseline and her , Jacobo.  This included:  Preparing for the procedure, the actual procedure and recovery.  I explained the risks of the lymph node biopsy:  Bleeding, infection, hitting an unintended organ (vessel or nerve)  I explained that usually the results return after two to four business days.    I explained that he/she would be contacted by Dr. Landon's office  following this to determine a future plan.  Thank you for involving us in the care of your patient.    40 minutes was spent with Josseline and her , Jacobo.  30 minutes was spent in counseling.    Gypsy Ruiz MS, APRN, CNS  Clinical Nurse Specialist  Interventional Radiology  [phone] (voice mail)  [phone] (pager)    CC  Patient Care Team:  Akila Lazo MD as PCP - General (Internal Medicine)  Idalia Adler MSW as   Lamar Landon MD as MD (BMT - Adult)  Chidi Wiggins MD as MD (Hematology & Oncology)  Naomi Phelps, RN as BMT Nurse Coordinator (Transplant)  LAMAR LANDON

## 2017-03-13 NOTE — PATIENT INSTRUCTIONS
You are scheduled for your biopsy on Thursday, March 16, 2017   Report to the [facility] Waiting room at 9:30 AM  The [facility] Waiting Room is located on the 2nd floor (street level) of the [address]  Your procedure is scheduled to start at approximately 11:00 AM    No solid foods or milk products for 6 hours prior on the day of the procedure.5:00 AM  You may have clear liquids until 2 hours prior on the day of the procedure.(water, apple juice, broth, coffee or art without milk or sugar, jell-o, white grape juice) 9:00 AM    You will need a     If you have any questions you may call the Radiology Nurse Line [phone]

## 2017-03-16 NOTE — IP AVS SNAPSHOT
MRN:[id number]                      After Visit Summary   3/16/2017    Josseline Simpson    MRN: [id number]           Visit Information        Department      3/16/2017  9:15 AM [address] [facility]          Review of your medicines      UNREVIEWED medicines. Ask your doctor about these medicines        Dose / Directions    acetaminophen 325 MG tablet   Commonly known as:  TYLENOL        Dose:  325-650 mg   Take 325-650 mg by mouth as needed for mild pain   Refills:  0       albuterol 108 (90 BASE) MCG/ACT Inhaler   Commonly known as:  PROAIR HFA/PROVENTIL HFA/VENTOLIN HFA   Used for:  Cough        Dose:  2 puff   Inhale 2 puffs into the lungs every 4 hours as needed for shortness of breath / dyspnea or wheezing   Quantity:  1 Inhaler   Refills:  11       citalopram 40 MG tablet   Commonly known as:  celeXA   Used for:  Moderate episode of recurrent major depressive disorder (H)        Dose:  60 mg   Take 1.5 tablets (60 mg) by mouth daily   Quantity:  450 tablet   Refills:  1       CLARITIN 10 MG tablet   Generic drug:  loratadine        Dose:  10 mg   Take 10 mg by mouth daily   Refills:  0       fluticasone 220 MCG/ACT Inhaler   Commonly known as:  FLOVENT HFA   Used for:  Cough        Dose:  2 puff   Inhale 2 puffs into the lungs 2 times daily   Quantity:  3 Inhaler   Refills:  1       Multi-vitamin Tabs tablet        Dose:  1 tablet   Take 1 tablet by mouth daily   Quantity:  100 tablet   Refills:  3       omeprazole 20 MG CR capsule   Commonly known as:  priLOSEC   Used for:  Gastroesophageal reflux disease without esophagitis        Dose:  20 mg   Take 1 capsule (20 mg) by mouth daily   Quantity:  90 capsule   Refills:  1       sulfamethoxazole-trimethoprim 800-160 MG per tablet   Commonly known as:  BACTRIM DS/SEPTRA DS   Indication:  pjp prophy   Used for:  Non Hodgkin's lymphoma (H)        Dose:  1 tablet   Take 1 tablet by mouth Every Mon, Tues two times daily DO NOT start  taking until day + 28 after your transplant   Quantity:  30 tablet   Refills:  3       VITAMIN D (CHOLECALCIFEROL) PO        Dose:  1000 Units   Take 1,000 Units by mouth 2 times daily   Refills:  0                Protect others around you: Learn how to safely use, store and throw away your medicines at www.disposemymeds.org.         Follow-ups after your visit        Your next 10 appointments already scheduled     Mar 23, 2017  1:00 PM CDT   Return with Lamar Landon MD   [facility] Mar[facility] Trans[facility] ([facility])    [address] [place] [id number] [phone]            Aug 21, 2017  9:30 AM CDT   LAB with  LAB   [facility] Lab ([facility])    [address] [place] [id number] [phone]           Patient must bring picture ID.  Patient should be prepared to give a urine specimen  Please do not eat 10-12 hours before your appointment if you are coming in fasting for labs on lipids, cholesterol, or glucose (sugar).  Pregnant women should follow their Care Team instructions. Water with medications is okay. Do not drink coffee or other fluids.   If you have concerns about taking  your medications, please ask at office or if scheduling via STinsert, send a message by clicking on Secure Messaging, Message Your Care Team.            Aug 21, 2017 10:00 AM CDT   (Arrive by 9:45 AM)   CT CHEST/ABDOMEN/PELVIS W CONTRAST with UCCT2   [facility] [place] [facility] ([facility])    [address] [place] [id number] [phone]           Please bring any scans or X-rays taken at other hospitals, if similar tests were done. Also bring a list of your medicines, including vitamins, minerals and over-the-counter drugs. It is safest to leave personal items at home.  Be sure to tell your doctor:   If you have any allergies.   If there s any chance you are pregnant.   If you  are breastfeeding.   If you have any special needs.  You may have contrast for this exam. To prepare:   Do not eat or drink for 2 hours before your exam. If you need to take medicine, you may take it with small sips of water. (We may ask you to take liquid medicine as well.)   The day before your exam, drink extra fluids at least six 8-ounce glasses (unless your doctor tells you to restrict your fluids).  Patients over 70 or patients with diabetes or kidney problems:   If you haven t had a blood test (creatinine test) within the last 30 days, go to your clinic or Diagnostic Imaging Department for this test.  If you have diabetes:   If your kidney function is normal, continue taking your metformin (Avandamet, Glucophage, Glucovance, Metaglip) on the day of your exam.   If your kidney function is abnormal, wait 48 hours before restarting this medicine.  You will have oral contrast for this exam:   You will drink the contrast at home. Get this from your clinic or Diagnostic Imaging Department. Please follow the directions given.  Please wear loose clothing, such as a sweat suit or jogging clothes. Avoid snaps, zippers and other metal. We may ask you to undress and put on a hospital gown.  If you have any questions, please call the Imaging Department where you will have your exam.            Aug 23, 2017  9:45 AM CDT   (Arrive by 9:30 AM)   Return Visit with Vamsi Infante MD   [facility] ([facility])    [address] [place] [id number] [phone]               Care Instructions        Further instructions from your care team       [facility]    Interventional Radiology  Patient Instructions Following Biopsy    AFTER YOU GO HOME  ? If you were given sedation DO NOT drive or operate machinery at home or at work for at least 24 hours  ? DO relax and take it easy for 48 hours, no strenuous activity for 24 hours  ? DO  drink plenty of fluids  ? DO resume your regular diet, unless otherwise instructed by your Primary Physician  ? Keep the dressing dry and in place for 24 hours.  ? DO NOT SMOKE FOR AT LEAST 24 HOURS, if you have been given any medications that were to help you relax or sedate you during your procedure  ? DO NOT drink alcoholic beverages the day of your procedure  ? DO NOT do any strenuous exercise or lifting (> 10 lbs) for at least 3 days following your procedure  ? DO NOT take a bath or shower for at least 12 hours following your procedure  ? Remove dressing after shower the next day. Replace with Band aid for 2 days.  Never leave a wet dressing in place.  ? DO NOT make any important or legal decisions for 24 hours following your procedure  ? There should be minimum drainage from the biopsy site    CALL THE PHYSICIAN IF:  ? You start bleeding from the procedure site.  If you do start to bleed from that site, lie down flat and hold pressure on the site for a minimum of 10 minutes.  Your physician will tell you if you need to return to the hospital  ? You develop nausea or vomiting  ? You have excessive swelling, redness, or tenderness at the site  ? You have drainage that looks like it is infected.  ? You experience severe pain  ? You develop hives or a rash or unexplained itching  ? You develop shortness of breath  ? You develop a temperature of 101 degrees F or greater    ADDITIONAL INSTRUCTIONS  If you are taking Coumadin, restart tonight.  Follow up with your Coumadin Clinic or Primary Care MD to have your INR rechecked.    [facility] INTERVENTIONAL RADIOLOGY DEPARTMENT  Procedure Physician: JULIA Morgan                                      Date of procedure: March 16, 2017  Telephone Numbers: [phone] Monday-Friday 8:00 am to 4:30 pm  [phone] After 4:30 pm Monday-Friday, Weekends & Holidays.   Ask for the Interventional Radiologist on call.  Someone is on call 24 hrs/day  [facility] toll free  "number: [phone] Monday-Friday 8:00 am to 4:30 pm  [facility] Emergency Dept: [phone]           Additional Information About Your Visit        [facility] Information     [facility] gives you secure access to your electronic health record. If you see a primary care provider, you can also send messages to your care team and make appointments. If you have questions, please call your primary care clinic.  If you do not have a primary care provider, please call [phone] and they will assist you.        Care EveryWhere ID     This is your Care EveryWhere ID. This could be used by other organizations to access your [facility] medical records  [id number]        Your Vitals Were     Blood Pressure Temperature Respirations Height Weight Pulse Oximetry    118/64 (BP Location: Left arm) 98  F (36.7  C) (Oral) 18 1.765 m (5' 9.5\") 75.1 kg (165 lb 8 oz) 97%    BMI (Body Mass Index)                   24.09 kg/m2            Primary Care Provider Office Phone # Fax #    Akila Lazo [phone]      Thank you!     Thank you for choosing [facility] for your care. Our goal is always to provide you with excellent care. Hearing back from our patients is one way we can continue to improve our services. Please take a few minutes to complete the written survey that you may receive in the mail after you visit with us. Thank you!             Medication List: This is a list of all your medications and when to take them. Check marks below indicate your daily home schedule. Keep this list as a reference.      Medications           Morning Afternoon Evening Bedtime As Needed    acetaminophen 325 MG tablet   Commonly known as:  TYLENOL   Take 325-650 mg by mouth as needed for mild pain                                albuterol 108 (90 BASE) MCG/ACT Inhaler   Commonly known as:  PROAIR HFA/PROVENTIL HFA/VENTOLIN HFA   Inhale 2 puffs into the lungs every 4 hours as needed for shortness of breath / dyspnea or wheezing          "                       citalopram 40 MG tablet   Commonly known as:  celeXA   Take 1.5 tablets (60 mg) by mouth daily                                CLARITIN 10 MG tablet   Take 10 mg by mouth daily   Generic drug:  loratadine                                fluticasone 220 MCG/ACT Inhaler   Commonly known as:  FLOVENT HFA   Inhale 2 puffs into the lungs 2 times daily                                Multi-vitamin Tabs tablet   Take 1 tablet by mouth daily                                omeprazole 20 MG CR capsule   Commonly known as:  priLOSEC   Take 1 capsule (20 mg) by mouth daily                                sulfamethoxazole-trimethoprim 800-160 MG per tablet   Commonly known as:  BACTRIM DS/SEPTRA DS   Take 1 tablet by mouth Every Mon, Tues two times daily DO NOT start taking until day + 28 after your transplant                                VITAMIN D (CHOLECALCIFEROL) PO   Take 1,000 Units by mouth 2 times daily

## 2017-03-16 NOTE — BRIEF OP NOTE
Interventional Radiology Brief Post Procedure Note    Procedure: @FVRISFRMTLINK([id number])@    Proceduralist: Zurdo Morgan MD and None    Assistant: None    Time Out: Prior to the start of the procedure and with procedural staff participation, I verbally confirmed the patient s identity using two indicators, relevant allergies, that the procedure was appropriate and matched the consent or emergent situation, and that the correct equipment/implants were available. Immediately prior to starting the procedure I conducted the Time Out with the procedural staff and re-confirmed the patient s name, procedure, and site/side. (The Joint Commission universal protocol was followed.)  Yes    Sedation: IR Nurse Monitored Care   Post Procedure Summary:  Prior to the start of the procedure and with procedural staff participation, I verbally confirmed the patient s identity using two indicators, relevant allergies, that the procedure was appropriate and matched the consent or emergent situation, and that the correct equipment/implants were available. Immediately prior to starting the procedure I conducted the Time Out with the procedural staff and re-confirmed the patient s name, procedure, and site/side. (The Joint Commission universal protocol was followed.)  Yes       Sedatives: Fentanyl and Midazolam (Versed)    Vital signs, airway and pulse oximetry were monitored and remained stable throughout the procedure and sedation was maintained until the procedure was complete.  The patient was monitored by staff until sedation discharge criteria were met.    Patient tolerance: Patient tolerated the procedure well with no immediate complications.    Time of sedation in minutes: 30 Minutes minutes from beginning to end of physician one to one monitoring.        Findings: 4 18 gauge cores    Estimated Blood Loss: Minimal    Fluoroscopy Time: Not applicable    SPECIMENS: Core needle biopsy specimens sent for pathological  analysis    Complications: 1. None     Condition: Stable    Plan: prn    Comments: See dictated procedure note for full details.    Zurdo Morgan MD

## 2017-03-16 NOTE — PROGRESS NOTES
Interventional Radiology Pre-Procedure Sedation Assessment   Time of Assessment: 10:50 AM    Expected Level: Moderate Sedation    Indication: Sedation is required for the following type of Procedure: Biopsy    Sedation and procedural consent: Risks, benefits and alternatives were discussed with Patient    PO Intake: Appropriately NPO for procedure    ASA Class: Class 3 - SEVERE SYSTEMIC DISEASE, DEFINITE FUNCTIONAL LIMITATIONS.    Mallampati: Grade 2:  Soft palate, base of uvula, tonsillar pillars, and portion of posterior pharyngeal wall visible    Lungs: Lungs Clear with good breath sounds bilaterally    Heart: Normal heart sounds and rate    History and physical reviewed and no updates needed. I have reviewed the lab findings, diagnostic data, medications, and the plan for sedation. I have determined this patient to be an appropriate candidate for the planned sedation and procedure and have reassessed the patient IMMEDIATELY PRIOR to sedation and procedure.    Higinio Crespo MD, MD

## 2017-03-16 NOTE — PROGRESS NOTES
Interventional Radiology Intra-procedural Nursing Note    Patient Name: Josseline Simpson  Medical Record Number: [id number]  Today's Date: March 16, 2017    Start Time:   End of procedure time:   Procedure: lymph node biopsy  Report given to:   Time pt departs:        Other Notes: pt to CT 2 from 2 a. Consent verified with all questions answered. Transferred care to IR LUCIAN.  CHAZ RUBI

## 2017-03-16 NOTE — PROGRESS NOTES
Pt ambulates with steady gait, pt has tolerated PO. Pt denies pain. Left mid back site remains CDI, no bleeding.  D/C instructions reviewed with pt, pt verbalizes understanding. Port de accessed.   5259: Pt transported to [facility] via wheelchair. Pt's spouse providing ride home.

## 2017-03-16 NOTE — PROGRESS NOTES
Pt back from IR s/p lymph node biopsy.  VSS.  Pt alert and oriented x4.  Pt denies any pain.  Lt mid back site F/D/I.  Pt's  is not at the bedside at the moment.

## 2017-03-16 NOTE — PROGRESS NOTES
Pt arrives to 2a, with spouse, for CT guided lymph node biopsy. Pre-procedure assessment completed. H&P is up to date. Consent needs to be signed. Port accessed using sterile technique.

## 2017-03-16 NOTE — DISCHARGE INSTRUCTIONS
[facility]    Interventional Radiology  Patient Instructions Following Biopsy    AFTER YOU GO HOME  ? If you were given sedation DO NOT drive or operate machinery at home or at work for at least 24 hours  ? DO relax and take it easy for 48 hours, no strenuous activity for 24 hours  ? DO drink plenty of fluids  ? DO resume your regular diet, unless otherwise instructed by your Primary Physician  ? Keep the dressing dry and in place for 24 hours.  ? DO NOT SMOKE FOR AT LEAST 24 HOURS, if you have been given any medications that were to help you relax or sedate you during your procedure  ? DO NOT drink alcoholic beverages the day of your procedure  ? DO NOT do any strenuous exercise or lifting (> 10 lbs) for at least 3 days following your procedure  ? DO NOT take a bath or shower for at least 12 hours following your procedure  ? Remove dressing after shower the next day. Replace with Band aid for 2 days.  Never leave a wet dressing in place.  ? DO NOT make any important or legal decisions for 24 hours following your procedure  ? There should be minimum drainage from the biopsy site    CALL THE PHYSICIAN IF:  ? You start bleeding from the procedure site.  If you do start to bleed from that site, lie down flat and hold pressure on the site for a minimum of 10 minutes.  Your physician will tell you if you need to return to the hospital  ? You develop nausea or vomiting  ? You have excessive swelling, redness, or tenderness at the site  ? You have drainage that looks like it is infected.  ? You experience severe pain  ? You develop hives or a rash or unexplained itching  ? You develop shortness of breath  ? You develop a temperature of 101 degrees F or greater    ADDITIONAL INSTRUCTIONS  If you are taking Coumadin, restart tonight.  Follow up with your Coumadin Clinic or Primary Care MD to have your INR rechecked.    [facility] INTERVENTIONAL RADIOLOGY DEPARTMENT  Procedure Physician: JULIA Morgan                                       Date of procedure: March 16, 2017  Telephone Numbers: [phone] Monday-Friday 8:00 am to 4:30 pm  [phone] After 4:30 pm Monday-Friday, Weekends & Holidays.   Ask for the Interventional Radiologist on call.  Someone is on call 24 hrs/day  [facility] toll free number: [phone] Monday-Friday 8:00 am to 4:30 pm  [facility] Emergency Dept: [phone]

## 2017-03-16 NOTE — IP AVS SNAPSHOT
[address] [id number]                                       After Visit Summary   3/16/2017    Josseline Simpson    MRN: [id number]           After Visit Summary Signature Page     I have received my discharge instructions, and my questions have been answered. I have discussed any challenges I see with this plan with the nurse or doctor.    ..........................................................................................................................................  Patient/Patient Representative Signature      ..........................................................................................................................................  Patient Representative Print Name and Relationship to Patient    ..................................................               ................................................  Date                                            Time    ..........................................................................................................................................  Reviewed by Signature/Title    ...................................................              ..............................................  Date                                                            Time

## 2017-03-20 NOTE — TELEPHONE ENCOUNTER
Dr. De La Cruz, are you able to review the surg path report from 3/10/17 and release it to the pt?     See my chart messages.     Dr. Kunz is out of the office this week.     Thanks.   Toyin

## 2017-03-20 NOTE — TELEPHONE ENCOUNTER
Please advise      Endometrial biopsy is benign; but does show polyp       -please keep (or make) follow up visit with Dr. Kunz       -to review further      He may recommend D&C/hysteroscopy in view of        -presence of polyp

## 2017-03-23 NOTE — PROGRESS NOTES
BONE MARROW TRANSPLANT FOLLOWUP NOTE       HISTORY OF PRESENT ILLNESS:  The patient is a 67-year-old female who is being seen for a 1-year anniversary visit for an autologous peripheral blood stem cell transplant for diffuse large B-cell lymphoma on 03/10/2016.  The patient is a 67-year-old female who was last seen for her 6-month evaluation in 09/2016.  I will briefly summarize her past medical history.  She has a past history of breast cancer which was treated with a right mastectomy, axillary lymph node dissection and chemotherapy in 1997.  She then subsequently presented in 09/2013 with intra-abdominal lymphadenopathy and a paravertebral mass as well as left axillary lymph nodes.  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  She was treated with CHOP chemotherapy without Rituxan because the CD20 studies were negative.  She completed 6 cycles of chemotherapy in 01/2014.  At that time, repeat imaging demonstrated a CR.  In 11/2014 she developed cervical lymphadenopathy without any constitutional symptoms and underwent an excisional biopsy.  This pathology revealed diffuse large B-cell lymphoma with some immunoblastic features.  Flow cytometry revealed this to be positive for CD19, CD20, CD5, monotypic surface and cytoplasmic lambda immunoglobulin light chains, but lacked CD10.  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  There was also loss of 1 copy each related to the tetraploid chromosomes which means 3 copies total of chromosomes 3, 4, 8, 10, 12 and 15.  In addition, there were 2 copies of a derivative chromosome composed of the long arm of chromosome 1 and chromosome 17 with concomitant losses of 1p and 17p.  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  FISH probes to BCL6, MYC and BCL2 indicated that 51% had 4 BCL6 signals, 29% had 3 MYC signals, 20% had 4 MYC signals, and 50% had 4 BCL2 signals.  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  The patient was treated subsequently with 2 cycles of R-ICE chemotherapy.  Her pre-transplant PET/CT revealed no evidence of residual PET activity, though there was some enlarged lymphadenopathy.  There was ground-glass infiltrate in the right lung which had been present for at least 2 years without change in size.  Her bone marrow flow studies revealed 1.1% CD5-positive lambda monotypic cells.  Electrophoresis revealed a monoclonal spike of 0.1 g/dL.  She received growth factor and Mozobil for priming and collected a total of 5.26 million CD34 cells per kilogram after 1 day of collection.  She received an autologous peripheral blood stem cell transplant on 03/10 after conditioning.  Her post-transplant course was complicated by neutropenic fever, mucositis, neutropenic typhilitis, nausea and diarrhea.  Her day 100 PET/CT had shown an increased size of a solid and ground-glass nodule in the superior segment of the right lower lobe concerning for adenocarcinoma.  She was referred to Thoracic Surgery and underwent a biopsy of this lesion.  The biopsies revealed adenocarcinoma with acinar 20% and lepidic growth pattern 80%, 1.2 cm in size.  Lymph nodes were negative.  She was seen by Dr. Infante in Oncology and was staged as T1 N0 M0, stage IA tumor.  She did not need chemotherapy or radiation therapy.  She has been following with Dr. Infante for this.      She was restaged with a PET/CT and bone marrow biopsy on 02/21.  Unfortunately, the PET/CT on 02/21 is showing signs of lymphoma recurrence including increased size and hypermetabolism of conglomerate lymphadenopathy in the para-aortic, left perirenal and  portocaval area with new, 1.1-cm, hypermetabolic, left cervical level 4 lymph node, focal hypermetabolism in the caudate lobe of the liver, and new focal hypermetabolism in the left inferolateral aspect of the T10 vertebral body.  There was also a comment about fluid in the endometrial cavity without defined associated hypermetabolism.  The maximum SUV was seen in the caudate lobe of the liver with an SUV of 9.9.  The conglomerate lymphadenopathy was seen to have an SUV of 7.8.  Bone marrow biopsy showed a cellularity of 30% with trilineage hematopoiesis and no evidence of lymphoma.  Counts were normal with white count of 4.9, hemoglobin of 14.1, and platelet count of 226,000.  Chemistries were normal.  Creatinine was normal at 0.89.  Liver function tests were normal.  LDH was 210.  Her free T4 and TSH were normal.  The patient underwent a biopsy of the L node mass which is positive for DLBCL. Over the past 3-4 days she has developed a rash under her left breast- which is painful.     PHYSICAL EXAMINATION:   VITAL SIGNS:  Vital signs are normal as recorded by the nurse and reviewed by me.   HEENT:  Pupils are equally round and reactive to light.  Extraocular movements are intact.  Moist mucous membranes.    CHEST:  Clear to auscultation bilaterally.    HEART:  Heart sounds are regular in rate and rhythm.  There are no murmurs or gallops.    ABDOMEN:  Soft and nontender.  There is no hepatosplenomegaly.    NEUROLOGICAL:  Exam was nonfocal.     LYMPH:  There was no significant cervical, axillary or inguinal lymphadenopathy that was palpable.  No abdominal masses were palpable, either.     SKIN: Vesicular rash under left breast currently does not cross the midline, no rash on the back    PET/CT: Signs of lymphoma recurrence including increased size and  hypermetabolism of conglomerate lymphadenopathy in the  para-aortic/left perirenal/portacaval area, new 1.1 cm hypermetabolic  left cervical level 4 lymph node, focal  hypermetabolism in the caudate  lobe of the liver, and new focal hypermetabolism at the left  inferolateral aspect of the T10 vertebral body.   Fluid in the endometrial cavity without definite associated  hypermetabolism. Recommend pelvic ultrasound.     Endometrium, biopsy-   - Benign adenomyomatous polyp.  Negative for atypia or malignancy.      Lymph node, left retroperitoneal, CT guided core needle biopsy:   -          Diffuse large B cell lymphoma   morphologically similar to the description of the 2015 case,   however, in variance to the prior case the neoplastic B cells appear to   be CD5 negative .   Of note, the neoplastic B cells appear to be CD20 positive by IHC and   negative for this marker by flow cytometry, similar discordance has also   been seen in the 2015 cases. The exact explanation is unknown.   The immunohistochemical staining pattern indicates non germinal center   subtype according to the Ike algorithm.        ASSESSMENT AND PLAN:     1.  The patient is a 67-year-old female who comes here for 1-year restaging after an autologous hematopoietic cell transplant for diffuse large B-cell lymphoma.  The patient was diagnosed with lung cancer and has undergone resection within the last year.  Now she has new lymphadenopathy with PET-avid lesions in her abdomen.    She underwent a core needle biopsy of this mass on 3/16- which reveals a recurrence of DLBCL, non germinal center type. Given her multiple malignancies, including her recent lung cancer within the last year (after autologous HCT)- she is not a candidate for an allogeneic HCT- Patient does not want to undergo this either. She understands the poor prognosis associated with this and wishes to pursue salvage chemotherapy. Given that this is the ABC subtype, she could possibly undergo gemcitabine based treatment along with immune modulating treatment (revlimid/ ibrutinib or bortezomib), and upon response -be maintaine on immune modulating  therapy. I have called Dr. Wiggins's office and left a message with him regarding this plan as well as my pager number to discuss this further  She will be seen by Interventional Radiology.    2.  Immunizations.  She is 1 year post-transplant.  We initiated her one year immunizations at her last visit.     3. Shingles: I will start her on Valtrex 1 gm tid- she will monitor the lesions  4. Endometrial Polyp- she will follow up with OB GYn     No follow up visit was arranged - she will follow up with Dr Wiggins

## 2017-03-23 NOTE — MR AVS SNAPSHOT
After Visit Summary   3/23/2017    Josseline Simpson    MRN: [id number]           Patient Information     Date Of Birth          1950        Visit Information        Provider Department      3/23/2017 1:00 PM Lamar Landon MD [facility] Blood and Marrow Transplant        Today's Diagnoses     Diffuse large B-cell lymphoma of extranodal site (H)    -  1          [facility] ([facility])  [address] [place] [address]  Phone: [phone]  Clinic Hours:   Monday-Friday:    8am to 4:30pm   Weekends and holidays:    8am to noon (in general)  If your fever is 100.5  or greater,   call the clinic.  After hours call the   hospital at [phone] or   [phone]. Ask for the BMT   fellow for pediatric or adult patients            Follow-ups after your visit        Your next 10 appointments already scheduled     Aug 21, 2017  9:30 AM CDT   LAB with  LAB   [facility] ([facility])    [address] [place] [id number] [phone]           Patient must bring picture ID.  Patient should be prepared to give a urine specimen  Please do not eat 10-12 hours before your appointment if you are coming in fasting for labs on lipids, cholesterol, or glucose (sugar).  Pregnant women should follow their Care Team instructions. Water with medications is okay. Do not drink coffee or other fluids.   If you have concerns about taking  your medications, please ask at office or if scheduling via Preisbockt, send a message by clicking on Secure Messaging, Message Your Care Team.            Aug 21, 2017 10:00 AM CDT   (Arrive by 9:45 AM)   CT CHEST/ABDOMEN/PELVIS W CONTRAST with UCCT2   [facility] [place] [facility] ([facility])    [address] [place] [address] [phone]           Please bring any scans or X-rays taken at other hospitals, if similar tests were done. Also bring a list of  your medicines, including vitamins, minerals and over-the-counter drugs. It is safest to leave personal items at home.  Be sure to tell your doctor:   If you have any allergies.   If there s any chance you are pregnant.   If you are breastfeeding.   If you have any special needs.  You may have contrast for this exam. To prepare:   Do not eat or drink for 2 hours before your exam. If you need to take medicine, you may take it with small sips of water. (We may ask you to take liquid medicine as well.)   The day before your exam, drink extra fluids at least six 8-ounce glasses (unless your doctor tells you to restrict your fluids).  Patients over 70 or patients with diabetes or kidney problems:   If you haven t had a blood test (creatinine test) within the last 30 days, go to your clinic or Diagnostic Imaging Department for this test.  If you have diabetes:   If your kidney function is normal, continue taking your metformin (Avandamet, Glucophage, Glucovance, Metaglip) on the day of your exam.   If your kidney function is abnormal, wait 48 hours before restarting this medicine.  You will have oral contrast for this exam:   You will drink the contrast at home. Get this from your clinic or Diagnostic Imaging Department. Please follow the directions given.  Please wear loose clothing, such as a sweat suit or jogging clothes. Avoid snaps, zippers and other metal. We may ask you to undress and put on a hospital gown.  If you have any questions, please call the Imaging Department where you will have your exam.            Aug 23, 2017  9:45 AM CDT   (Arrive by 9:30 AM)   Return Visit with Vamsi Infante MD   [facility] ([facility])    [address] [address] [phone]              Who to contact     If you have questions or need follow up information about today's clinic visit or your schedule please contact [facility] BLOOD AND MARROW  TRANSPLANT directly at [phone].  Normal or non-critical lab and imaging results will be communicated to you by [employer] TVhart, letter or phone within 4 business days after the clinic has received the results. If you do not hear from us within 7 days, please contact the clinic through [employer] TV[employer] or phone. If you have a critical or abnormal lab result, we will notify you by phone as soon as possible.  Submit refill requests through Plazes or call your pharmacy and they will forward the refill request to us. Please allow 3 business days for your refill to be completed.          Additional Information About Your Visit        [employer] TVhart Information     Plazes gives you secure access to your electronic health record. If you see a primary care provider, you can also send messages to your care team and make appointments. If you have questions, please call your primary care clinic.  If you do not have a primary care provider, please call [phone] and they will assist you.        Care EveryWhere ID     This is your Care EveryWhere ID. This could be used by other organizations to access your [facility] medical records  [id number]        Your Vitals Were     Pulse Temperature Respirations BMI (Body Mass Index)          81 98  F (36.7  C) (Oral) 18 25.09 kg/m2         Blood Pressure from Last 3 Encounters:   03/23/17 127/74   03/16/17 101/52   03/13/17 115/69    Weight from Last 3 Encounters:   03/23/17 78.2 kg (172 lb 6.4 oz)   03/16/17 75.1 kg (165 lb 8 oz)   03/13/17 78 kg (172 lb)              Today, you had the following     No orders found for display         Today's Medication Changes          These changes are accurate as of: 3/23/17 11:59 PM.  If you have any questions, ask your nurse or doctor.               Start taking these medicines.        Dose/Directions    valACYclovir 1000 mg tablet   Commonly known as:  VALTREX   Used for:  Diffuse large B-cell lymphoma of extranodal site (H)   Started by:  Lamar Landon MD         Dose:  1000 mg   Take 1 tablet (1,000 mg) by mouth 3 times daily for 14 days   Quantity:  42 tablet   Refills:  0         These medicines have changed or have updated prescriptions.        Dose/Directions    sulfamethoxazole-trimethoprim 800-160 MG per tablet   Commonly known as:  BACTRIM DS/SEPTRA DS   Indication:  pjp prophy   This may have changed:  additional instructions   Used for:  Non Hodgkin's lymphoma (H)        Dose:  1 tablet   Take 1 tablet by mouth Every Mon, Tues two times daily DO NOT start taking until day + 28 after your transplant   Quantity:  30 tablet   Refills:  3            Where to get your medicines      These medications were sent to [facility] [address] [place] [address] - [address] [place] [address] [address] AT [place] [address]  [address] [facility] [address] , [facility] [address]     Phone:  [phone]     valACYclovir 1000 mg tablet                Recent Review Flowsheet Data     BMT Recent Results Latest Ref Rng & Units 8/11/2016 8/18/2016 9/8/2016 11/9/2016 2/21/2017 2/21/2017 3/16/2017    WBC 4.0 - 11.0 10e9/L - 4.7 4.5 4.8 - 4.9 3.5(L)    Hemoglobin 11.7 - 15.7 g/dL - 11.4(L) 12.4 13.2 - 14.1 13.5    Platelet Count 150 - 450 10e9/L 187 334 200 209 - 226 195    Neutrophils (Absolute) 1.6 - 8.3 10e9/L - 3.2 2.8 - - 3.5 2.2    INR 0.86 - 1.14 - - - - - - -    Sodium 133 - 144 mmol/L - 138 138 - - 139 -    Potassium 3.4 - 5.3 mmol/L - 4.1 4.0 - - 4.1 -    Chloride 94 - 109 mmol/L - 103 103 - - 104 -    Glucose 70 - 99 mg/dL - 93 82 - 90 86 -    Urea Nitrogen 7 - 30 mg/dL - 12 13 - - 14 -    Creatinine 0.52 - 1.04 mg/dL - 0.67 0.75 - - 0.89 -    Calcium (Total) 8.5 - 10.1 mg/dL - 9.3 9.5 - - 9.4 -    Protein (Total) 6.8 - 8.8 g/dL - 6.2(L) 6.3(L) - - 6.7(L) -    Albumin 3.4 - 5.0 g/dL - 3.1(L) 3.6 - - 3.8 -    Alkaline Phosphatase 40 - 150 U/L - 132 112 - - 89 -    AST 0 - 45 U/L - 16 20 - - 21 -    ALT 0 - 50 U/L - 37 28 - - 34 -    MCV 78 - 100 fl - 96 94 96 - 96 98               Primary  Care Provider Office Phone # Fax #    Akila Lazo [phone]       [facility] [address] [facility] [address]  [facility] [address]        Thank you!     Thank you for choosing [facility] BLOOD AND MARROW TRANSPLANT  for your care. Our goal is always to provide you with excellent care. Hearing back from our patients is one way we can continue to improve our services. Please take a few minutes to complete the written survey that you may receive in the mail after your visit with us. Thank you!             Your Updated Medication List - Protect others around you: Learn how to safely use, store and throw away your medicines at www.disposemymeds.org.          This list is accurate as of: 3/23/17 11:59 PM.  Always use your most recent med list.                   Brand Name Dispense Instructions for use    acetaminophen 325 MG tablet    TYLENOL     Take 325-650 mg by mouth as needed for mild pain       albuterol 108 (90 BASE) MCG/ACT Inhaler    PROAIR HFA/PROVENTIL HFA/VENTOLIN HFA    1 Inhaler    Inhale 2 puffs into the lungs every 4 hours as needed for shortness of breath / dyspnea or wheezing       citalopram 40 MG tablet    celeXA    450 tablet    Take 1.5 tablets (60 mg) by mouth daily       CLARITIN 10 MG tablet   Generic drug:  loratadine      Take 10 mg by mouth daily       fluticasone 220 MCG/ACT Inhaler    FLOVENT HFA    3 Inhaler    Inhale 2 puffs into the lungs 2 times daily       Multi-vitamin Tabs tablet     100 tablet    Take 1 tablet by mouth daily       omeprazole 20 MG CR capsule    priLOSEC    90 capsule    Take 1 capsule (20 mg) by mouth daily       sulfamethoxazole-trimethoprim 800-160 MG per tablet    BACTRIM DS/SEPTRA DS    30 tablet    Take 1 tablet by mouth Every Mon, Tues two times daily DO NOT start taking until day + 28 after your transplant       valACYclovir 1000 mg tablet    VALTREX    42 tablet    Take 1 tablet (1,000 mg) by mouth 3 times daily for 14 days        VITAMIN D (CHOLECALCIFEROL) PO      Take 1,000 Units by mouth 2 times daily

## 2017-03-23 NOTE — PROGRESS NOTES
BMT Heme Malignancy Rooming Note    Josseline Simpson - 3/23/2017 12:55 PM     Chief Complaint   Patient presents with     RECHECK     DLBCL~ here for provider visit        /74  Pulse 81  Temp 98  F (36.7  C) (Oral)  Resp 18  Wt 78.2 kg (172 lb 6.4 oz)  BMI 25.09 kg/m2     Medications reviewed: Yes    Labs drawn: No    Dressing changed: No     Medications given: No    Staff time:8    Additional information if applicable: Patient reports having a painful rash under left breast.  MD notified.    Johanna Oliver, RN

## 2017-07-24 NOTE — TELEPHONE ENCOUNTER
Omeprazole       Last Written Prescription Date: 1/13/17  Last Fill Quantity: 90,  # refills: 1   Last Office Visit with G, P or [facility] prescribing provider: 3/6/17

## 2017-08-23 NOTE — MR AVS SNAPSHOT
After Visit Summary   8/23/2017    Josseline Simpson    MRN: [id number]           Patient Information     Date Of Birth          1950        Visit Information        Provider Department      8/23/2017 9:45 AM Vamsi Infante MD [facility]        Today's Diagnoses     Malignant neoplasm of lung, unspecified laterality, unspecified part of lung (H)    -  1       Follow-ups after your visit        Follow-up notes from your care team     Return if symptoms worsen or fail to improve.      Who to contact     If you have questions or need follow up information about today's clinic visit or your schedule please contact [facility] directly at [phone].  Normal or non-critical lab and imaging results will be communicated to you by MyChart, letter or phone within 4 business days after the clinic has received the results. If you do not hear from us within 7 days, please contact the clinic through [email or web address] or phone. If you have a critical or abnormal lab result, we will notify you by phone as soon as possible.  Submit refill requests through [employer] or call your pharmacy and they will forward the refill request to us. Please allow 3 business days for your refill to be completed.          Additional Information About Your Visit        MyChart Information     [employer] gives you secure access to your electronic health record. If you see a primary care provider, you can also send messages to your care team and make appointments. If you have questions, please call your primary care clinic.  If you do not have a primary care provider, please call [phone] and they will assist you.        Care EveryWhere ID     This is your Care EveryWhere ID. This could be used by other organizations to access your [place] medical records  [id number]        Your Vitals Were     Pulse Temperature Respirations Pulse Oximetry BMI (Body Mass Index)       80 98.9  F (37.2   C) (Oral) 16 94% 24.71 kg/m2        Blood Pressure from Last 3 Encounters:   08/23/17 125/75   03/23/17 127/74   03/16/17 101/52    Weight from Last 3 Encounters:   08/23/17 77 kg (169 lb 12.1 oz)   03/23/17 78.2 kg (172 lb 6.4 oz)   03/16/17 75.1 kg (165 lb 8 oz)              Today, you had the following     No orders found for display         Today's Medication Changes          These changes are accurate as of: 8/23/17  9:06 PM.  If you have any questions, ask your nurse or doctor.               These medicines have changed or have updated prescriptions.        Dose/Directions    sulfamethoxazole-trimethoprim 800-160 MG per tablet   Commonly known as:  BACTRIM DS/SEPTRA DS   Indication:  pjp prophy   This may have changed:  additional instructions   Used for:  Non Hodgkin's lymphoma (H)        Dose:  1 tablet   Take 1 tablet by mouth Every Mon, Tues two times daily DO NOT start taking until day + 28 after your transplant   Quantity:  30 tablet   Refills:  3                Primary Care Provider Office Phone # Fax #    Akila Lazo [phone]       [address]  [place] [address]        Equal Access to Services     [facility]: Hadii augusta cody hadasho Soomaali, waaxda luqadaha, qaybta kaalmada adeegyada, chavez saldana. So [facility] [phone].    ATENCIÓN: Si habla español, tiene a richards disposición servicios gratuitos de asistencia lingüística. Llame al [phone].    We comply with applicable federal civil rights laws and [place] laws. We do not discriminate on the basis of race, color, national origin, age, disability sex, sexual orientation or gender identity.            Thank you!     Thank you for choosing [facility]  for your care. Our goal is always to provide you with excellent care. Hearing back from our patients is one way we can continue to improve our services. Please take a few minutes to complete the written survey  that you may receive in the mail after your visit with us. Thank you!             Your Updated Medication List - Protect others around you: Learn how to safely use, store and throw away your medicines at www.disposemymeds.org.          This list is accurate as of: 8/23/17  9:06 PM.  Always use your most recent med list.                   Brand Name Dispense Instructions for use Diagnosis    acetaminophen 325 MG tablet    TYLENOL     Take 325-650 mg by mouth as needed for mild pain        albuterol 108 (90 BASE) MCG/ACT Inhaler    PROAIR HFA/PROVENTIL HFA/VENTOLIN HFA    1 Inhaler    Inhale 2 puffs into the lungs every 4 hours as needed for shortness of breath / dyspnea or wheezing    Cough       citalopram 40 MG tablet    celeXA    450 tablet    Take 1.5 tablets (60 mg) by mouth daily    Moderate episode of recurrent major depressive disorder (H)       CLARITIN 10 MG tablet   Generic drug:  loratadine      Take 10 mg by mouth daily        fluticasone 220 MCG/ACT Inhaler    FLOVENT HFA    3 Inhaler    Inhale 2 puffs into the lungs 2 times daily    Cough       Multi-vitamin Tabs tablet     100 tablet    Take 1 tablet by mouth daily        omeprazole 20 MG CR capsule    priLOSEC    90 capsule    Take 1 capsule (20 mg) by mouth daily    Gastroesophageal reflux disease without esophagitis       sulfamethoxazole-trimethoprim 800-160 MG per tablet    BACTRIM DS/SEPTRA DS    30 tablet    Take 1 tablet by mouth Every Mon, Tues two times daily DO NOT start taking until day + 28 after your transplant    Non Hodgkin's lymphoma (H)       VITAMIN D (CHOLECALCIFEROL) PO      Take 1,000 Units by mouth 2 times daily

## 2017-08-23 NOTE — PROGRESS NOTES
CHIEF COMPLAINT:  Follow up for Stage IA non-small cell lung cancer.      HISTORY OF PRESENT ILLNESS:  The patient had resection of a stage IA lepidic adenocarcinoma in 08/2016.  No adjuvant therapy was done.  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 She also has relapsed NHL initially treated with R-CHOP and autologous Stem cell transplant. She had a relapse of her NHL only recently and is started on Rituxan and Bendamustine and has received 5/6 cycles till today. She follows [facility].      Interval Hx:     She has finished 5/6 cycles of chemotherpay with Rituxan and Bendamustine with [facility] oncology for NHL.She has been having problems with fatigue and nausea. She did not have problems with cytopenias. She has on and off constipation for which she takes stool softeners.She uses inhalers for occasional chest tightness. Appetite and weight is stable. She is pretty active at home and does daily exercises.     No fever, chills, rigors, SOB, abdominal pain, N/V, LE edema.     REVIEW OF SYSTEMS:  A 10-point review of systems is largely negative.  The patient does not feel any discomfort in her neck.      Past Medical, Surgical, Social Hx , Family Hx : Reviewed    Current Outpatient Prescriptions   Medication     omeprazole (PRILOSEC) 20 MG CR capsule     albuterol (PROAIR HFA/PROVENTIL HFA/VENTOLIN HFA) 108 (90 BASE) MCG/ACT Inhaler     fluticasone (FLOVENT HFA) 220 MCG/ACT inhaler     sulfamethoxazole-trimethoprim (BACTRIM DS,SEPTRA DS) 800-160 MG per tablet     citalopram (CELEXA) 40 MG tablet     acetaminophen (TYLENOL) 325 MG tablet     loratadine (CLARITIN) 10 MG tablet     VITAMIN D, CHOLECALCIFEROL, PO     multivitamin, therapeutic with minerals (MULTI-VITAMIN) TABS     No current facility-administered medications for this visit.      Facility-Administered Medications Ordered in Other Visits   Medication     heparin 100 UNIT/ML injection 5 mL     heparin 100 UNIT/ML injection 5 mL       "  IMAGING:  Review of PET scan dated 08/2017 from the outside report showed no evidence of recurrence.     LABORATORY:  Laboratory tests done outside including CBC and CMP were unremarkable.      PHYSICAL EXAMINATION:    /75  Pulse 80  Temp 98.9  F (37.2  C) (Oral)  Resp 16  Wt 77 kg (169 lb 12.1 oz)  SpO2 94%  BMI 24.71 kg/m2     Gen: Well built and nourished, not in any acute distress  HEENT: MMM, no oral lesions, no pallor, icterus  Neck: supple,   Lymph: no cervical, sc, axillary LAD   CV: RRR, no murmurs  Resp: CTA  Abd: Soft, NTND, no HSM   Ext: no edema   Neuro: AAOx4. Cranial nerves grossly intact. Strength 5/5 throughout.  Normal muscle tone. Sensations grossly intact. 3+ symmetric reflexes in Knee jt     ASSESSMENT AND PLAN:       1.  Stage IA non-small cell lung cancer:  Last PET/CT (08/05/2017 done for lymphoma) There is no evidence to indicate recurrence. Although, we did not have access to the films, the report mentioned no new lesions to indicate recurrence of lung cancer (although the report might have a typo error where it mentioned \"New GGO or nodules\" instead of \"No New\").The patient does have an EGFR mutation L858R that would be amenable to EGFR-targeted therapy.    I will suggest the patient have follow up with her regular Oncologist Dr. Chidi Wiggins . She is 1 yr post surgery and probably will need surveillance imaging with CT scan every 6-12 months for upto 3 yrs.     2.  Non-Hodgkin's lymphoma:  NHL initially treated with R-CHOP and autologous Stem cell transplant. She had a relapse of her NHL only recently and is started on Rituxan and Bendamustine and has received 5/6 cycles till today. She follows [facility].     I have independently seen and examined this patient and my assessment is in agreement with the above note.  I have reviewed all tests and past medical history and my evaluation agrees with the findings in the note.    The patient has no evidence of NSCLC " recurrence.  Her NHL is being treated by Dr. Wiggins of [facility].  I spoke with the patient that he can continue follow up for the stage 1A NSCLC.  If the cancer recurs, the tumor harbors an EGFR mutation of L858R.

## 2018-01-01 ENCOUNTER — TRANSFERRED RECORDS (OUTPATIENT)
Dept: HEALTH INFORMATION MANAGEMENT | Facility: CLINIC | Age: 68
End: 2018-01-01

## 2018-01-01 ENCOUNTER — TELEPHONE (OUTPATIENT)
Dept: INTERNAL MEDICINE | Facility: CLINIC | Age: 68
End: 2018-01-01

## 2018-01-01 ENCOUNTER — MYC MEDICAL ADVICE (OUTPATIENT)
Dept: INTERNAL MEDICINE | Facility: CLINIC | Age: 68
End: 2018-01-01

## 2018-01-01 ENCOUNTER — OFFICE VISIT (OUTPATIENT)
Dept: INTERNAL MEDICINE | Facility: CLINIC | Age: 68
End: 2018-01-01
Payer: COMMERCIAL

## 2018-01-01 ENCOUNTER — RADIANT APPOINTMENT (OUTPATIENT)
Dept: GENERAL RADIOLOGY | Facility: CLINIC | Age: 68
End: 2018-01-01
Attending: PHYSICIAN ASSISTANT
Payer: COMMERCIAL

## 2018-01-01 VITALS
SYSTOLIC BLOOD PRESSURE: 104 MMHG | HEART RATE: 102 BPM | HEIGHT: 70 IN | TEMPERATURE: 98.8 F | OXYGEN SATURATION: 97 % | DIASTOLIC BLOOD PRESSURE: 56 MMHG | WEIGHT: 169 LBS | BODY MASS INDEX: 24.2 KG/M2

## 2018-01-01 DIAGNOSIS — C34.91 ADENOCARCINOMA, LUNG, RIGHT (H): ICD-10-CM

## 2018-01-01 DIAGNOSIS — J20.9 ACUTE BRONCHITIS WITH COEXISTING CONDITION REQUIRING PROPHYLACTIC TREATMENT: Primary | ICD-10-CM

## 2018-01-01 DIAGNOSIS — K21.9 GASTROESOPHAGEAL REFLUX DISEASE WITHOUT ESOPHAGITIS: ICD-10-CM

## 2018-01-01 DIAGNOSIS — C83.38 DIFFUSE LARGE B-CELL LYMPHOMA OF LYMPH NODES OF MULTIPLE REGIONS (H): ICD-10-CM

## 2018-01-01 DIAGNOSIS — Z94.81 STATUS POST BONE MARROW TRANSPLANT (H): ICD-10-CM

## 2018-01-01 PROCEDURE — 99214 OFFICE O/P EST MOD 30 MIN: CPT | Performed by: PHYSICIAN ASSISTANT

## 2018-01-01 PROCEDURE — 71046 X-RAY EXAM CHEST 2 VIEWS: CPT

## 2018-01-01 RX ORDER — DOXYCYCLINE 100 MG/1
100 CAPSULE ORAL 2 TIMES DAILY
Qty: 14 CAPSULE | Refills: 0 | Status: SHIPPED | OUTPATIENT
Start: 2018-01-01

## 2018-01-01 ASSESSMENT — ENCOUNTER SYMPTOMS
HEADACHES: 0
SHORTNESS OF BREATH: 1
VOMITING: 0
CLAUDICATION: 0
COUGH: 1
CHILLS: 0
NAUSEA: 0
FOCAL WEAKNESS: 0
FEVER: 0
MYALGIAS: 0
PND: 0
ABDOMINAL PAIN: 0
DIARRHEA: 0

## 2018-01-01 ASSESSMENT — PATIENT HEALTH QUESTIONNAIRE - PHQ9
SUM OF ALL RESPONSES TO PHQ QUESTIONS 1-9: 5
10. IF YOU CHECKED OFF ANY PROBLEMS, HOW DIFFICULT HAVE THESE PROBLEMS MADE IT FOR YOU TO DO YOUR WORK, TAKE CARE OF THINGS AT HOME, OR GET ALONG WITH OTHER PEOPLE: SOMEWHAT DIFFICULT
SUM OF ALL RESPONSES TO PHQ QUESTIONS 1-9: 5
SUM OF ALL RESPONSES TO PHQ QUESTIONS 1-9: 5

## 2018-01-09 NOTE — TELEPHONE ENCOUNTER
Patient wanted FYI message sent to PCP.  States she is currently undergoing chemo for the 4th time.  Has had a recurrence of her Non-Hodgkins Lymphoma and is in a clinical trial through [facility].  States this is her last option.    Has been ill since 12/15/17 with URI.  Was given a Zpak by her oncologist around 12/25/17 which gave partial improvement but after finishing it the symptoms returned.  She has non-productive cough, occasional SOB, night sweats but when she checks her temps during the day it is .7.  She gets occasional chest pains and feels extremely tired.  She is eating, taking in a lot of fluids and is urinating well.  Is just worn out.      She has schedule appt with YONAS Cummings here tomorrow. TEO Sexton R.N.

## 2018-01-10 NOTE — PROGRESS NOTES
HPI    SUBJECTIVE:       Josseline Simpson is a 67 year old female who complains of moderate dry cough, fatigue, & intermittent SOB onset 12/15/17. Pt has hx lung cancer (with resection in Aug 2016) and NHL. She had stem cell transplant in 2016, relapsed last year & had chemo which was completed in Sept 2017. She then relapsed again and is currently in a clinical trial at [facility] for an experimental chemotherapy. She is followed by [facility]. Last chemo was 1/2/18, next chemo is 1/22/18. WBC count was 4.9 on 1/2/18. She was given a Zpak by her oncologist 12/25/17 which she completed as directed. Noted some improvement, but symptoms returned once she stopped the antibiotic. SOB occurs mainly when going up the stairs. Symptoms are constant in duration. No treatments tried. Denies fever/chills, hemoptysis, PND, orthopnea, weight gain, leg swelling, myalgias, HA, CP, abd pain, N/V/D, rash, or any other symptoms.        Chart Review:  PHQ-9 SCORE 11/17/2015 6/13/2016 1/10/2018   Total Score - - -   Total Score MyChart - - 5 (Mild depression)   Total Score 6 5 5     SHANIQUA-7 SCORE 3/5/2014 6/9/2014 1/26/2015   Total Score 5 6 1       Patient Active Problem List   Diagnosis     Advanced directives, counseling/discussion     Hypercholesteremia     Personal history of malignant neoplasm of breast     CARDIOVASCULAR SCREENING; LDL GOAL LESS THAN 160     Need for prophylactic measure     Need for prophylactic immunotherapy     Neutropenia, febrile (H)     Status post bone marrow transplant (H)     Non-Hodgkin's lymphoma, unspecified body region, unspecified non-Hodgkin lymphoma type (H)     Family history of basal cell carcinoma     Hilar lymphadenopathy     Adenocarcinoma, lung, right (H)     Moderate episode of recurrent major depressive disorder (H)     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)     Non Hodgkin's lymphoma (H)     Past Surgical History:   Procedure Laterality Date      ARTHROSCOPY KNEE Left      BIOPSY LYMPH NODE CERVICAL Right 2015    Procedure: BIOPSY LYMPH NODE CERVICAL;  Surgeon: David Marvin MD;  Location: [facility] OR     LAPAROSCOPIC WEDGE RESECTION LUNG Right 2016    Procedure: LAPAROSCOPIC WEDGE RESECTION LUNG;  Surgeon: Loi Woodruff MD;  Location: [facility] OR     MASTECTOMY, SIMPLE RT/LT/KASIE Right      TONSILLECTOMY       Family History   Problem Relation Age of Onset     CEREBROVASCULAR DISEASE Mother       82 yo and arthritis     CANCER Mother 75     Basal Cell Skin Cancer      DIABETES Father       89 yo and CHF, psych illness     Psychotic Disorder Sister      DIABETES Paternal Grandmother      CANCER Maternal Grandmother      HEART DISEASE Maternal Grandfather      HEART DISEASE Paternal Grandfather      Breast Cancer No family hx of       Social History   Substance Use Topics     Smoking status: Never Smoker     Smokeless tobacco: Never Used     Alcohol use 0.0 oz/week     0 Standard drinks or equivalent per week      Comment: wine - one per day        Problem list, Medication list, Allergies, Medical/Social/Surg hx reviewed in [facility], updated as appropriate.      Review of Systems   Constitutional: Positive for malaise/fatigue. Negative for chills and fever.   Respiratory: Positive for cough and shortness of breath.    Cardiovascular: Negative for chest pain, claudication, leg swelling and PND.   Gastrointestinal: Negative for abdominal pain, diarrhea, nausea and vomiting.   Musculoskeletal: Negative for myalgias.   Skin: Negative for rash.   Neurological: Negative for focal weakness and headaches.   All other systems reviewed and are negative.      Physical Exam   Constitutional: She is oriented to person, place, and time and well-developed, well-nourished, and in no distress.   HENT:   Head: Normocephalic and atraumatic.   Right Ear: Tympanic membrane, external ear and ear canal normal.   Left Ear: Tympanic membrane,  "external ear and ear canal normal.   Nose: Nose normal.   Mouth/Throat: Uvula is midline, oropharynx is clear and moist and mucous membranes are normal.   Cardiovascular: Normal rate, regular rhythm and normal heart sounds.    Pulmonary/Chest: Effort normal and breath sounds normal.   Musculoskeletal: Normal range of motion.   Neurological: She is alert and oriented to person, place, and time. Gait normal.   Skin: Skin is warm and dry.   Nursing note and vitals reviewed.    Vital Signs  /56 (BP Location: Left arm, Patient Position: Sitting, Cuff Size: Adult Regular)  Pulse 102  Temp 98.8  F (37.1  C) (Oral)  Ht 5' 9.5\" (1.765 m)  Wt 169 lb (76.7 kg)  SpO2 97%  BMI 24.6 kg/m2   Body mass index is 24.6 kg/(m^2).    Diagnostic Test Results:  Results for orders placed or performed in visit on 01/10/18 (from the past 24 hour(s))   XR Chest 2 Views    Narrative    XR CHEST 2 VW 1/10/2018 8:57 AM    HISTORY: Lymphoma currently on chemo; cough/SOB since 12/15/17.  Treated with Zpak in Dec and symptoms improved temporarily, then  returned. Also hx lung CA with resection in August 2016.    COMPARISON: 11/9/2016    FINDINGS: No airspace consolidation, pleural effusion or pneumothorax.  Left port and catheter appear stable and appropriately positioned.  Normal heart size.      Impression    IMPRESSION: No evidence of pneumonia or other acute pulmonary  abnormality.    JESUS SAUCEDA MD     ASSESSMENT/PLAN:                                                        ICD-10-CM    1. Acute bronchitis with coexisting condition requiring prophylactic treatment J20.9 XR Chest 2 Views     doxycycline (VIBRAMYCIN) 100 MG capsule   2. Adenocarcinoma, lung, right (H) C34.91    3. 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 Status post bone marrow transplant (H) Z94.81      Lungs CTAB, O2 sat 97%. CXR normal. Pt immunsuppressed due to currently undergoing chemotherapy, also w/ hx neutropenia " although WBC count was normal last week. Will treat with empiric abx. Fluoroquinolones interact w/ several of pt's medications so will try doxycycline. Pt has albuterol inhaler- use this for SOB as needed. If symptoms don't improve, f/u with PCP. Check w/ oncology prior to starting the antibiotic to ensure no interactions.      I have discussed any lab or imaging results, the patient's diagnosis, and my plan of treatment with the patient and/or family. Patient is aware to come back in if with worsening symptoms or if no relief despite treatment plan.  Patient voiced understanding and had no further questions.       Follow Up: Return if symptoms worsen or fail to improve.    MADAI Wilson, PA-C  [facility]

## 2018-01-10 NOTE — PROGRESS NOTES
".  SUBJECTIVE:   Josseline Simpson is a 67 year old female who presents to clinic today for the following health issues:  Answers for HPI/ROS submitted by the patient on 1/10/2018   If you checked off any problems, how difficult have these problems made it for you to do your work, take care of things at home, or get along with other people?: Somewhat difficult  PHQ9 TOTAL SCORE: 5      {Superlists:262465}    {additional problems for provider to add:867987}    Problem list and histories reviewed & adjusted, as indicated.  Additional history: {NONE - AS DOCUMENTED:010523::\"as documented\"}    {HIST REVIEW/ LINKS 2:606500}    Reviewed and updated as needed this visit by clinical staff       Reviewed and updated as needed this visit by Provider         {PROVIDER CHARTING PREFERENCE:875005}    "

## 2018-01-10 NOTE — MR AVS SNAPSHOT
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      Who to contact     If you have questions or need follow up information about today's clinic visit or your schedule please contact [facility] directly at [phone].  Normal or non-critical lab and imaging results will be communicated to you by [employer], letter or phone within 4 business days after the clinic has received the results. If you do not hear from us within 7 days, please contact the clinic through [employer] or phone. If you have a critical or abnormal lab result, we will notify you by phone as soon as possible.  Submit refill requests through [employer] or call your pharmacy and they will forward the refill request to us. Please allow 3 business days for your refill to be completed.          Additional Information About Your Visit        [employer] Information     [employer] gives you secure access to your electronic health record. If you see a primary care provider, you can also send messages to your care team and make appointments. If you have questions, please call your primary care clinic.  If you do not have a primary care provider, please call [phone] and they will assist you.        Care EveryWhere ID     This is your Care EveryWhere ID. This could be used by other organizations to access your  "[place] medical records  [id number]        Your Vitals Were     Pulse Temperature Height Pulse Oximetry BMI (Body Mass Index)       102 98.8  F (37.1  C) (Oral) 5' 9.5\" (1.765 m) 97% 24.6 kg/m2        Blood Pressure from Last 3 Encounters:   01/10/18 104/56   08/23/17 125/75   03/23/17 127/74    Weight from Last 3 Encounters:   01/10/18 169 lb (76.7 kg)   08/23/17 169 lb 12.1 oz (77 kg)   03/23/17 172 lb 6.4 oz (78.2 kg)              We Performed the Following     XR Chest 2 Views          Today's Medication Changes          These changes are accurate as of: 1/10/18  9:19 AM.  If you have any questions, ask your nurse or doctor.               Start taking these medicines.        Dose/Directions    doxycycline 100 MG capsule   Commonly known as:  VIBRAMYCIN   Used for:  Acute bronchitis with coexisting condition requiring prophylactic treatment   Started by:  Aracelis Cummings PA-C        Dose:  100 mg   Take 1 capsule (100 mg) by mouth 2 times daily   Quantity:  14 capsule   Refills:  0            Where to get your medicines      These medications were sent to [facility] [address] [place] [address] 42  AT [address] [facility] [address]     Phone:  [phone]     doxycycline 100 MG capsule                Primary Care Provider Office Phone # Fax #    Akila Lazo [phone]       [address] [facility] [address]  [facility] [address]        Equal Access to Services     [facility]: Hadii augusta cody hadasho Soomaali, waaxda luqadaha, qaybta kaalmada adeegyaoscar, chavez idiin hayaan adeeg kharash la'aan . So [facility] [phone].    ATENCIÓN: Si habla español, tiene a richards disposición servicios gratuitos de asistencia lingüística. Llame al [phone].    We comply with applicable federal civil rights laws and [place] laws. We do not discriminate on the basis of race, color, national origin, age, disability, sex, sexual " orientation, or gender identity.            Thank you!     Thank you for choosing [facility]  for your care. Our goal is always to provide you with excellent care. Hearing back from our patients is one way we can continue to improve our services. Please take a few minutes to complete the written survey that you may receive in the mail after your visit with us. Thank you!             Your Updated Medication List - Protect others around you: Learn how to safely use, store and throw away your medicines at www.disposemymeds.org.          This list is accurate as of: 1/10/18  9:19 AM.  Always use your most recent med list.                   Brand Name Dispense Instructions for use Diagnosis    acetaminophen 325 MG tablet    TYLENOL     Take 325-650 mg by mouth as needed for mild pain        ACYCLOVIR PO           albuterol 108 (90 BASE) MCG/ACT Inhaler    PROAIR HFA/PROVENTIL HFA/VENTOLIN HFA    1 Inhaler    Inhale 2 puffs into the lungs every 4 hours as needed for shortness of breath / dyspnea or wheezing    Cough       citalopram 40 MG tablet    celeXA    135 tablet    Take 1.5 tablets (60 mg) by mouth daily    Moderate episode of recurrent major depressive disorder (H)       CLARITIN 10 MG tablet   Generic drug:  loratadine      Take 10 mg by mouth daily        DEXAMETHASONE PO           doxycycline 100 MG capsule    VIBRAMYCIN    14 capsule    Take 1 capsule (100 mg) by mouth 2 times daily    Acute bronchitis with coexisting condition requiring prophylactic treatment       fluticasone 220 MCG/ACT Inhaler    FLOVENT HFA    3 Inhaler    Inhale 2 puffs into the lungs 2 times daily    Cough       Multi-vitamin Tabs tablet     100 tablet    Take 1 tablet by mouth daily        omeprazole 20 MG CR capsule    priLOSEC    90 capsule    Take 1 capsule (20 mg) by mouth daily    Gastroesophageal reflux disease without esophagitis       VITAMIN D (CHOLECALCIFEROL) PO      Take 1,000 Units by mouth 2 times daily

## 2018-01-10 NOTE — NURSING NOTE
"Chief Complaint   Patient presents with     URI     She has non-productive cough, occasional SOB, night sweats but when she checks her temps during the day it is .7.  She gets occasional chest pains and feels extremely tired.  She is eating, taking in a lot of fluids and is urinating well.  Is just worn out.         Initial /56 (BP Location: Left arm, Patient Position: Sitting, Cuff Size: Adult Regular)  Pulse 102  Temp 98.8  F (37.1  C) (Oral)  Ht 5' 9.5\" (1.765 m)  Wt 169 lb (76.7 kg)  SpO2 97%  BMI 24.6 kg/m2 Estimated body mass index is 24.6 kg/(m^2) as calculated from the following:    Height as of this encounter: 5' 9.5\" (1.765 m).    Weight as of this encounter: 169 lb (76.7 kg).  Medication Reconciliation: complete    "

## 2018-01-15 NOTE — TELEPHONE ENCOUNTER
LM on patient's home voicemail to return call with an update on how she is feeling.  TEO Sexton R.N.

## 2018-01-18 NOTE — TELEPHONE ENCOUNTER
Pt was seen in clinic on 1/10/18. Left [facility] [place] for pt to call back just to let us know how she is doing.   SHOAIB Roy

## 2018-01-30 NOTE — PROGRESS NOTES
Chart reviewed.  Encounter was not reviewed with provider.  Patient was not examined by me.  Cecy Ortiz MD

## 2024-10-14 NOTE — TELEPHONE ENCOUNTER
Prescription approved per [facility] [place] [place] Refill Protocol.    
What Type Of Note Output Would You Prefer (Optional)?: Standard Output
Hpi Title: Evaluation of Skin Lesions
How Severe Are Your Spot(S)?: mild

## (undated) RX ORDER — FENTANYL CITRATE 50 UG/ML
INJECTION, SOLUTION INTRAMUSCULAR; INTRAVENOUS
Status: DISPENSED
Start: 2017-01-01

## (undated) RX ORDER — SODIUM CHLORIDE 9 MG/ML
INJECTION, SOLUTION INTRAVENOUS
Status: DISPENSED
Start: 2017-01-01

## (undated) RX ORDER — HEPARIN SODIUM (PORCINE) LOCK FLUSH IV SOLN 100 UNIT/ML 100 UNIT/ML
SOLUTION INTRAVENOUS
Status: DISPENSED
Start: 2017-01-01